# Patient Record
Sex: FEMALE | Race: WHITE | NOT HISPANIC OR LATINO | URBAN - METROPOLITAN AREA
[De-identification: names, ages, dates, MRNs, and addresses within clinical notes are randomized per-mention and may not be internally consistent; named-entity substitution may affect disease eponyms.]

---

## 2020-06-05 ENCOUNTER — INPATIENT (INPATIENT)
Facility: HOSPITAL | Age: 75
LOS: 2 days | Discharge: HOME | DRG: 247 | End: 2020-06-08
Attending: INTERNAL MEDICINE | Admitting: INTERNAL MEDICINE
Payer: MEDICARE

## 2020-06-05 ENCOUNTER — EMERGENCY (EMERGENCY)
Facility: HOSPITAL | Age: 75
LOS: 1 days | Discharge: ANOTHER HOSPITAL | End: 2020-06-05
Attending: EMERGENCY MEDICINE | Admitting: EMERGENCY MEDICINE
Payer: MEDICARE

## 2020-06-05 VITALS
WEIGHT: 167.99 LBS | SYSTOLIC BLOOD PRESSURE: 122 MMHG | RESPIRATION RATE: 16 BRPM | HEIGHT: 64.5 IN | OXYGEN SATURATION: 97 % | DIASTOLIC BLOOD PRESSURE: 63 MMHG | HEART RATE: 72 BPM | TEMPERATURE: 98 F

## 2020-06-05 VITALS
TEMPERATURE: 98 F | SYSTOLIC BLOOD PRESSURE: 158 MMHG | DIASTOLIC BLOOD PRESSURE: 96 MMHG | HEART RATE: 102 BPM | RESPIRATION RATE: 16 BRPM | OXYGEN SATURATION: 99 % | WEIGHT: 167.99 LBS | HEIGHT: 65 IN

## 2020-06-05 VITALS
HEART RATE: 88 BPM | SYSTOLIC BLOOD PRESSURE: 169 MMHG | RESPIRATION RATE: 16 BRPM | DIASTOLIC BLOOD PRESSURE: 79 MMHG | OXYGEN SATURATION: 98 %

## 2020-06-05 DIAGNOSIS — I21.4 NON-ST ELEVATION (NSTEMI) MYOCARDIAL INFARCTION: ICD-10-CM

## 2020-06-05 LAB
ALBUMIN SERPL ELPH-MCNC: 3.4 G/DL — SIGNIFICANT CHANGE UP (ref 3.3–5)
ALP SERPL-CCNC: 89 U/L — SIGNIFICANT CHANGE UP (ref 40–120)
ALT FLD-CCNC: 28 U/L — SIGNIFICANT CHANGE UP (ref 12–78)
ANION GAP SERPL CALC-SCNC: 8 MMOL/L — SIGNIFICANT CHANGE UP (ref 5–17)
APTT BLD: 38 SEC — HIGH (ref 28.5–37)
AST SERPL-CCNC: 33 U/L — SIGNIFICANT CHANGE UP (ref 15–37)
BASOPHILS # BLD AUTO: 0.11 K/UL — SIGNIFICANT CHANGE UP (ref 0–0.2)
BASOPHILS NFR BLD AUTO: 1 % — SIGNIFICANT CHANGE UP (ref 0–2)
BILIRUB SERPL-MCNC: 0.4 MG/DL — SIGNIFICANT CHANGE UP (ref 0.2–1.2)
BUN SERPL-MCNC: 18 MG/DL — SIGNIFICANT CHANGE UP (ref 7–23)
CALCIUM SERPL-MCNC: 9.2 MG/DL — SIGNIFICANT CHANGE UP (ref 8.5–10.1)
CHLORIDE SERPL-SCNC: 110 MMOL/L — HIGH (ref 96–108)
CO2 SERPL-SCNC: 20 MMOL/L — LOW (ref 22–31)
CREAT SERPL-MCNC: 0.77 MG/DL — SIGNIFICANT CHANGE UP (ref 0.5–1.3)
D DIMER BLD IA.RAPID-MCNC: 173 NG/ML DDU — SIGNIFICANT CHANGE UP
EOSINOPHIL # BLD AUTO: 0.68 K/UL — HIGH (ref 0–0.5)
EOSINOPHIL NFR BLD AUTO: 6.4 % — HIGH (ref 0–6)
GLUCOSE SERPL-MCNC: 157 MG/DL — HIGH (ref 70–99)
HCT VFR BLD CALC: 45.6 % — HIGH (ref 34.5–45)
HGB BLD-MCNC: 15.5 G/DL — SIGNIFICANT CHANGE UP (ref 11.5–15.5)
IMM GRANULOCYTES NFR BLD AUTO: 0.4 % — SIGNIFICANT CHANGE UP (ref 0–1.5)
INR BLD: 1.05 RATIO — SIGNIFICANT CHANGE UP (ref 0.88–1.16)
LYMPHOCYTES # BLD AUTO: 28.4 % — SIGNIFICANT CHANGE UP (ref 13–44)
LYMPHOCYTES # BLD AUTO: 3.02 K/UL — SIGNIFICANT CHANGE UP (ref 1–3.3)
MCHC RBC-ENTMCNC: 28.9 PG — SIGNIFICANT CHANGE UP (ref 27–34)
MCHC RBC-ENTMCNC: 34 GM/DL — SIGNIFICANT CHANGE UP (ref 32–36)
MCV RBC AUTO: 85.1 FL — SIGNIFICANT CHANGE UP (ref 80–100)
MONOCYTES # BLD AUTO: 0.74 K/UL — SIGNIFICANT CHANGE UP (ref 0–0.9)
MONOCYTES NFR BLD AUTO: 7 % — SIGNIFICANT CHANGE UP (ref 2–14)
NEUTROPHILS # BLD AUTO: 6.05 K/UL — SIGNIFICANT CHANGE UP (ref 1.8–7.4)
NEUTROPHILS NFR BLD AUTO: 56.8 % — SIGNIFICANT CHANGE UP (ref 43–77)
NRBC # BLD: 0 /100 WBCS — SIGNIFICANT CHANGE UP (ref 0–0)
PLATELET # BLD AUTO: 325 K/UL — SIGNIFICANT CHANGE UP (ref 150–400)
POTASSIUM SERPL-MCNC: 4.6 MMOL/L — SIGNIFICANT CHANGE UP (ref 3.5–5.3)
POTASSIUM SERPL-SCNC: 4.6 MMOL/L — SIGNIFICANT CHANGE UP (ref 3.5–5.3)
PROT SERPL-MCNC: 7.6 G/DL — SIGNIFICANT CHANGE UP (ref 6–8.3)
PROTHROM AB SERPL-ACNC: 11.8 SEC — SIGNIFICANT CHANGE UP (ref 10–12.9)
RBC # BLD: 5.36 M/UL — HIGH (ref 3.8–5.2)
RBC # FLD: 12.6 % — SIGNIFICANT CHANGE UP (ref 10.3–14.5)
SARS-COV-2 RNA SPEC QL NAA+PROBE: SIGNIFICANT CHANGE UP
SODIUM SERPL-SCNC: 138 MMOL/L — SIGNIFICANT CHANGE UP (ref 135–145)
TROPONIN I SERPL-MCNC: 0.63 NG/ML — HIGH (ref 0.01–0.04)
WBC # BLD: 10.64 K/UL — HIGH (ref 3.8–10.5)
WBC # FLD AUTO: 10.64 K/UL — HIGH (ref 3.8–10.5)

## 2020-06-05 PROCEDURE — 93010 ELECTROCARDIOGRAM REPORT: CPT

## 2020-06-05 PROCEDURE — 71046 X-RAY EXAM CHEST 2 VIEWS: CPT

## 2020-06-05 PROCEDURE — 99284 EMERGENCY DEPT VISIT MOD MDM: CPT

## 2020-06-05 PROCEDURE — 99291 CRITICAL CARE FIRST HOUR: CPT

## 2020-06-05 PROCEDURE — 93005 ELECTROCARDIOGRAM TRACING: CPT

## 2020-06-05 PROCEDURE — 99285 EMERGENCY DEPT VISIT HI MDM: CPT | Mod: 25

## 2020-06-05 PROCEDURE — 85027 COMPLETE CBC AUTOMATED: CPT

## 2020-06-05 PROCEDURE — 85730 THROMBOPLASTIN TIME PARTIAL: CPT

## 2020-06-05 PROCEDURE — 92941 PRQ TRLML REVSC TOT OCCL AMI: CPT | Mod: LD

## 2020-06-05 PROCEDURE — 80053 COMPREHEN METABOLIC PANEL: CPT

## 2020-06-05 PROCEDURE — 84484 ASSAY OF TROPONIN QUANT: CPT

## 2020-06-05 PROCEDURE — 85610 PROTHROMBIN TIME: CPT

## 2020-06-05 PROCEDURE — 36415 COLL VENOUS BLD VENIPUNCTURE: CPT

## 2020-06-05 PROCEDURE — 82550 ASSAY OF CK (CPK): CPT

## 2020-06-05 PROCEDURE — 87635 SARS-COV-2 COVID-19 AMP PRB: CPT

## 2020-06-05 PROCEDURE — 99152 MOD SED SAME PHYS/QHP 5/>YRS: CPT

## 2020-06-05 PROCEDURE — 93454 CORONARY ARTERY ANGIO S&I: CPT | Mod: 26,59

## 2020-06-05 PROCEDURE — 71046 X-RAY EXAM CHEST 2 VIEWS: CPT | Mod: 26

## 2020-06-05 PROCEDURE — 85379 FIBRIN DEGRADATION QUANT: CPT

## 2020-06-05 RX ORDER — SODIUM CHLORIDE 9 MG/ML
1000 INJECTION INTRAMUSCULAR; INTRAVENOUS; SUBCUTANEOUS ONCE
Refills: 0 | Status: COMPLETED | OUTPATIENT
Start: 2020-06-05 | End: 2020-06-05

## 2020-06-05 RX ORDER — METOPROLOL TARTRATE 50 MG
25 TABLET ORAL
Refills: 0 | Status: DISCONTINUED | OUTPATIENT
Start: 2020-06-05 | End: 2020-06-07

## 2020-06-05 RX ORDER — CLOPIDOGREL BISULFATE 75 MG/1
75 TABLET, FILM COATED ORAL DAILY
Refills: 0 | Status: DISCONTINUED | OUTPATIENT
Start: 2020-06-06 | End: 2020-06-08

## 2020-06-05 RX ORDER — NORTRIPTYLINE HYDROCHLORIDE 10 MG/1
100 CAPSULE ORAL AT BEDTIME
Refills: 0 | Status: DISCONTINUED | OUTPATIENT
Start: 2020-06-05 | End: 2020-06-08

## 2020-06-05 RX ORDER — METOPROLOL TARTRATE 50 MG
25 TABLET ORAL ONCE
Refills: 0 | Status: COMPLETED | OUTPATIENT
Start: 2020-06-05 | End: 2020-06-05

## 2020-06-05 RX ORDER — ATORVASTATIN CALCIUM 80 MG/1
20 TABLET, FILM COATED ORAL ONCE
Refills: 0 | Status: COMPLETED | OUTPATIENT
Start: 2020-06-05 | End: 2020-06-05

## 2020-06-05 RX ORDER — ATORVASTATIN CALCIUM 80 MG/1
40 TABLET, FILM COATED ORAL AT BEDTIME
Refills: 0 | Status: DISCONTINUED | OUTPATIENT
Start: 2020-06-05 | End: 2020-06-08

## 2020-06-05 RX ORDER — ASPIRIN/CALCIUM CARB/MAGNESIUM 324 MG
81 TABLET ORAL DAILY
Refills: 0 | Status: DISCONTINUED | OUTPATIENT
Start: 2020-06-06 | End: 2020-06-08

## 2020-06-05 RX ORDER — CLONAZEPAM 1 MG
0.25 TABLET ORAL AT BEDTIME
Refills: 0 | Status: DISCONTINUED | OUTPATIENT
Start: 2020-06-05 | End: 2020-06-08

## 2020-06-05 RX ORDER — SUCRALFATE 1 G
1 TABLET ORAL ONCE
Refills: 0 | Status: COMPLETED | OUTPATIENT
Start: 2020-06-05 | End: 2020-06-05

## 2020-06-05 RX ORDER — CLONAZEPAM 1 MG
0.5 TABLET ORAL ONCE
Refills: 0 | Status: DISCONTINUED | OUTPATIENT
Start: 2020-06-05 | End: 2020-06-05

## 2020-06-05 RX ADMIN — Medication 25 MILLIGRAM(S): at 14:46

## 2020-06-05 RX ADMIN — NORTRIPTYLINE HYDROCHLORIDE 100 MILLIGRAM(S): 10 CAPSULE ORAL at 21:51

## 2020-06-05 RX ADMIN — Medication 0.5 MILLIGRAM(S): at 13:35

## 2020-06-05 RX ADMIN — ATORVASTATIN CALCIUM 20 MILLIGRAM(S): 80 TABLET, FILM COATED ORAL at 14:46

## 2020-06-05 RX ADMIN — ATORVASTATIN CALCIUM 40 MILLIGRAM(S): 80 TABLET, FILM COATED ORAL at 21:50

## 2020-06-05 RX ADMIN — Medication 0.25 MILLIGRAM(S): at 21:51

## 2020-06-05 RX ADMIN — SODIUM CHLORIDE 1000 MILLILITER(S): 9 INJECTION INTRAMUSCULAR; INTRAVENOUS; SUBCUTANEOUS at 12:43

## 2020-06-05 RX ADMIN — Medication 1 GRAM(S): at 14:02

## 2020-06-05 NOTE — H&P CARDIOLOGY - HISTORY OF PRESENT ILLNESS
76 y/o female without reported PMHx of anxiety, depression presented to [facility] today c/o chest pain worsening 3am this morning 7-8/10. Pt reports she has been experiencing midsternal chest pain for past few weeks usually relieved with ASA 81mg with moving around and got worse 3am today woke up from sleep radiated to left arm. Pt's  drove pt to urgent care then advised to go to ER. Pt denies hemoptysis, recent travel/surgery, fever, chills, numbness/weakness, palpitations, leg swelling, calf pain, or any other complaints. Pt had Troponin 0.63, COVID Negative today    PCP Jalil Velásquez (), will f/u Dr. Carrasquillo ('s PCP)  Card: EAN Frazier 76 y/o  female with PMHx of anxiety, depression presented to [facility] ER today c/o chest pain worsening 3am this morning 7-8/10. Pt reports she has been experiencing midsternal chest pain for past few weeks usually relieved with ASA 81mg with moving around and got worse 3am today woke up from sleep0 radiated to left arm. Pt's  drove pt to urgent care then advised to go to ER. Pt denies hemoptysis, recent travel/surgery, fever, chills, numbness/weakness, palpitations, leg swelling, calf pain, or any other complaints. Pt had Troponin 0.63, COVID Negative today, now transferred here for cardiac cath. Pt currently denies chest pain, SOB or palpitation.     PCP Jalil Velásquez (), will f/u Dr. Carrasquillo ('s PCP)  Card: EAN Frazier

## 2020-06-05 NOTE — ED PROVIDER NOTE - ATTENDING CONTRIBUTION TO CARE
Pt is a 76 yo female who presents to the ED with a cc of chest discomfort and reported abnormal EKG from urgent care.  PMHx of anxiety, depression.  Pt reports that for the last several weeks she has been experiencing generalized fatigue and right chest discomfort with radiation to her right arm.  She reports that the chest discomfort is generally present at night.  She has no known cardiac disease.  When present she reports that the discomfort is pressure like In nature.  Currently pt reports that she is symptom free at this time.  When symptoms are present they are not worsened with exertion.  Denies pleuritic component.         76 y/o female without reported PMHx sent from Urgent care due to chest pain x weeks. Pt reports chest pain worsened last night which prompted her to come to ED, but pain has resolved currently. pt describes pain as pressure, "sitting on chest", radiating down right arm, and currently 0/10. Pt reports no specific modifying factors, not worse with exertion/deep breathing, notes typically occurs at night when laying. pt admits to SOB for months without acute change, notes typically occurs going up and down the stairs. pt denies hemoptysis, recent travel/surgery, fever, chills, numbness/weakness, palpitations, hx of CAD, leg swelling, calf pain, or any other complaints. Pt is a 74 yo female who presents to the ED with a cc of chest discomfort and reported abnormal EKG from urgent care.  PMHx of anxiety, depression.  Pt reports that for the last several weeks she has been experiencing generalized fatigue and right chest discomfort with radiation to her right arm.  She reports that the chest discomfort is generally present at night.  She has no known cardiac disease.  When present she reports that the discomfort is pressure like In nature.  Currently pt reports that she is symptom free at this time.  When symptoms are present they are not worsened with exertion.  Denies pleuritic component. Denies fever, chills, V/D/C, CP at this time, SOB, cough, abd pain, ext numbness.  Denies HA.  Pt does report intermittent nausea not currently present.  Denies noting calf pain or swelling. She was concerned about her symptoms and so she presented to urgent care where there was concern regarding her EKG and so pt was sent to the ED for further work up. On exam pt lying in bed anxious and tearful. NCAT, PERRL, EOMI, heart sinus tachycardia, lungs CTA, abd soft NT/ND.  CN II-XII intact.   Pt with atypical chest discomfort, non exertional, no SOB.  Symptoms appear to have an anxiety component.  Will obtain screening labs, EKG, check troponin, d dimer, and monitor.  Emotional support provided

## 2020-06-05 NOTE — ED PROVIDER NOTE - OBJECTIVE STATEMENT
76 y/o female without reported PMHx sent from Urgent care due to chest pain x weeks. Pt reports chest pain worsened last night which prompted her to come to ED, but pain has resolved currently. pt describes pain as pressure, "sitting on chest", radiating down right arm, and currently 0/10. Pt reports no specific modifying factors, not worse with exertion/deep breathing, notes typically occurs at night when laying. pt admits to SOB for months without acute change, notes typically occurs going up and down the stairs. pt denies hemoptysis, recent travel/surgery, fever, chills, numbness/weakness, palpitations, hx of CAD, leg swelling, calf pain, or any other complaints.   PCP Jalil Velásquez ()  Cardio- none 74 y/o female without reported PMHx sent from Urgent care due to chest pain x weeks. Pt reports chest pain worsened last night which prompted her to come to ED, but pain has resolved currently. pt describes pain as pressure, "sitting on chest", radiating down right arm, and currently 0/10. Pt reports no specific modifying factors, not worse with exertion/deep breathing, notes typically occurs at night when laying. pt admits to SOB for months without acute change, notes typically occurs going up and down the stairs. Pt notes she took Aspirin with some relief. pt denies hemoptysis, recent travel/surgery, fever, chills, numbness/weakness, palpitations, hx of CAD, leg swelling, calf pain, or any other complaints.   PCP Jalil Velásquez ()  Cardio- none

## 2020-06-05 NOTE — ED PROVIDER NOTE - PHYSICAL EXAMINATION
Constitutional: Awake, Alert, non-toxic. NAD. Well appearing, well nourished.   HEAD: Normocephalic, atraumatic.   EYES: EOM intact, conjunctiva and sclera are clear bilaterally.   ENT: No rhinorrhea, patent, mucous membranes pink/moist, no drooling or stridor.   NECK: Supple, non-tender  CARDIOVASCULAR: Normal S1, S2; regular rate and rhythm.  RESPIRATORY: Normal respiratory effort; breath sounds CTAB, no wheezes, rhonchi, or rales. Speaking in full sentences. No accessory muscle use.   ABDOMEN: Soft; non-tender, non-distended.   EXTREMITIES: Full passive and active ROM in all extremities; non-tender to palpation; distal pulses palpable and symmetric, no LE edema, negative Santa Sign.   SKIN: Warm, dry; good skin turgor, no apparent lesions or rashes, no ecchymosis, brisk capillary refill.  NEURO: A&O x3. Sensory and motor functions are grossly intact. Speech is normal. Appearance and judgement seem appropriate for gender and age.

## 2020-06-05 NOTE — ED PROVIDER NOTE - CLINICAL SUMMARY MEDICAL DECISION MAKING FREE TEXT BOX
c/o chest pain x weeks. Notes acute worsening last night, radiation down right arm. Plan includes labs, EKG/troponin r/o CAD, CXR, cardio consult, re-assess

## 2020-06-05 NOTE — CONSULT NOTE ADULT - ASSESSMENT
apparent nstemi  has received asa  will swab for covid as precursor to transfer  if cannot get cath till monday would be unfortunate given her anxiety levels which are high  if needs to stay would use dapt, heparin gtt x 24h, statin, bb, bp control  echo  monitor for recurrent ischemia, arrhythmia and heart failure  plan for cath today if covid neg in time for the lab to do this today, otherwise will need to hold her until after the weekend    The patient is at risk of abrupt decompensation.  I have personally provided  35 minutes of critical care time, excluding time spent on separate procedures.

## 2020-06-05 NOTE — ED PROVIDER NOTE - SHIFT CHANGE DETAILS
pt with suspected NSTEMI pending COVID prior to cath lab acceptance.  Paperwork for transfer partially completed.  Pt symptom free at this time

## 2020-06-05 NOTE — ED PROVIDER NOTE - CROS ED [facility] ALL NEG
----- Message from Susan Winters MA sent at 4/3/2017  9:53 AM CDT -----  Pt states that she is bleeding for the past 2 weeks. She is on the depo. [phone]   
Patient state she is experiencing a minimal menstural cycle on Depo Provera injection. Instructed patient to contact our office is bleeding last longer than 7 days or if she is saturating a pad an hour. Patient verbalized understanding with no questions or concerns. Denies saturating a pad an hour, cramping, pain, fever, nausea, vomiting or any other symptoms.   
negative...

## 2020-06-05 NOTE — CHART NOTE - NSCHARTNOTEFT_GEN_A_CORE
Patient underwent a PCI procedure and is being admitted as they are at increased risk for major adverse cardiac and vascular events if discharged due to the following high risk characteristics:      Pre-procedure Clinical Criteria  Major: NSTEMI  Minor:     Patient s/p FRANCISCO J x1 to Diag.  via RRA. Started on aspirin, plavix, atorvastatin, metoprolol. Band to be removed at 930pm by CCU NP.     Nicci Sharma [facility]  Invasive Cardiology  [phone]

## 2020-06-05 NOTE — CONSULT NOTE ADULT - SUBJECTIVE AND OBJECTIVE BOX
[facility]         Joni Ace, Emelyn, Karin, Yuri, Joshua, Gabe        [phone] (office)    CHIEF COMPLAINT: Patient is a 75y old  Female who presents with a chief complaint of cp    HPI:  75f little regular medical care, as she generally feels well.  Has anxiety and depression for which she has been on nortriptyline and klonipin.  Has not had sxs suggestive of angina, heart failure or arrhythmia.  For about 1 month has been having sxs of mid-sternal chest discomfort in bed, without rad, assoc sxs and without clear provocation.  These sxs would go away when she would turn over.    This am at 3a she was awakened from sleep by chest discomfort rad to right arm, lasting 1 hour. fell asleep till 630 this am and when she woke up she still had sxs in her right arm.  She went to urgent care and was referred her.    her ekg shows a sr with a rbbb of indeterminate age.  Her trop is elevated. consultation is now being obtained.    PAST MEDICAL & SURGICAL HISTORY:  Depression  Anxiety  No significant past surgical history      SOCIAL HISTORY: No active tobacco, alcohol or illicit drug use    FAMILY HISTORY:   No pertinent family history of CAD    Outpatient medications: nortryp, klonipin    MEDICATIONS  (STANDING):    MEDICATIONS  (PRN):      Allergies    No Known Allergies    Intolerances        REVIEW OF SYSTEMS: Is negative for eye, ENT, GI, , allergic, dermatologic, musculoskeletal and neurologic, except as described above.    VITAL SIGNS:   Vital Signs Last 24 Hrs  T(C): 36.8 (05 Jun 2020 11:23), Max: 36.8 (05 Jun 2020 11:23)  T(F): 98.2 (05 Jun 2020 11:23), Max: 98.2 (05 Jun 2020 11:23)  HR: 102 (05 Jun 2020 11:23) (102 - 102)  BP: 158/96 (05 Jun 2020 11:23) (158/96 - 158/96)  BP(mean): --  RR: 16 (05 Jun 2020 11:23) (16 - 16)  SpO2: 99% (05 Jun 2020 11:23) (99% - 99%)    I&O's Summary      PHYSICAL EXAM:    Constitutional: NAD, awake and alert, well-developed  Eyes:  EOMI, no oral cyanosis, conjunctivae clear, anicteric.  Pulmonary: Non-labored, breath sounds are clear bilaterally, no wheezing, rales or rhonchi  Cardiovascular:  regular S1 and S2. No murmur.  No rubs, gallops or clicks  Gastrointestinal: Bowel Sounds present, soft, nontender.   Lymph: No peripheral edema.   Neurological: Alert, strength and sensitivity are grossly intact  Skin: No obvious lesions/rashes.   Psych:  Mood & affect appropriate .    LABS: All Labs Reviewed:                        15.5   10.64 )-----------( 325      ( 05 Jun 2020 12:02 )             45.6     05 Jun 2020 12:38    138    |  110    |  18     ----------------------------<  157    4.6     |  20     |  0.77     Ca    9.2        05 Jun 2020 12:38    TPro  7.6    /  Alb  3.4    /  TBili  0.4    /  DBili  x      /  AST  33     /  ALT  28     /  AlkPhos  89     05 Jun 2020 12:38    PT/INR - ( 05 Jun 2020 12:02 )   PT: 11.8 sec;   INR: 1.05 ratio         PTT - ( 05 Jun 2020 12:02 )  PTT:38.0 sec  CARDIAC MARKERS ( 05 Jun 2020 12:38 )  .630 ng/mL / x     / 167 U/L / x     / x          Blood Culture:         RADIOLOGY:    EKG:    Impression/Plan:

## 2020-06-05 NOTE — CHART NOTE - NSCHARTNOTEFT_GEN_A_CORE
Removal of Radial Band    Pulses in the right upper extremity are palpable. The patient was placed in the supine position. The insertion site was identified and the band deflated per protocol. The radial band was removed slowly.     Monitoring of the right wrists and both upper extremities including neuro-vascular checks and vital signs every 15 minutes x 4.    Complications: None    Comments: Patient tolerated procedure well with no blood loss.

## 2020-06-05 NOTE — ED ADULT NURSE NOTE - OBJECTIVE STATEMENT
pt with complaints of chest pain last night and pt went to urgent care this morning and was sent here for EKG changes. pt denies chest pain at this time.

## 2020-06-05 NOTE — ED PROVIDER NOTE - PROGRESS NOTE DETAILS
Discussed With Dr. Frazier, recommending transfer to [facility] for CAth, advised to send to [facility] for transfer. Discussed with Nima from Transfer center, discussed with Dr. Rico, pt to be transferred to cath

## 2020-06-06 LAB
ALBUMIN SERPL ELPH-MCNC: 3.7 G/DL — SIGNIFICANT CHANGE UP (ref 3.3–5)
ALP SERPL-CCNC: 80 U/L — SIGNIFICANT CHANGE UP (ref 40–120)
ALT FLD-CCNC: 22 U/L — SIGNIFICANT CHANGE UP (ref 10–45)
ANION GAP SERPL CALC-SCNC: 12 MMOL/L — SIGNIFICANT CHANGE UP (ref 5–17)
AST SERPL-CCNC: 22 U/L — SIGNIFICANT CHANGE UP (ref 10–40)
BASOPHILS # BLD AUTO: 0.12 K/UL — SIGNIFICANT CHANGE UP (ref 0–0.2)
BASOPHILS NFR BLD AUTO: 1 % — SIGNIFICANT CHANGE UP (ref 0–2)
BILIRUB SERPL-MCNC: 0.2 MG/DL — SIGNIFICANT CHANGE UP (ref 0.2–1.2)
BUN SERPL-MCNC: 16 MG/DL — SIGNIFICANT CHANGE UP (ref 7–23)
CALCIUM SERPL-MCNC: 9.1 MG/DL — SIGNIFICANT CHANGE UP (ref 8.4–10.5)
CHLORIDE SERPL-SCNC: 104 MMOL/L — SIGNIFICANT CHANGE UP (ref 96–108)
CO2 SERPL-SCNC: 21 MMOL/L — LOW (ref 22–31)
CREAT SERPL-MCNC: 0.72 MG/DL — SIGNIFICANT CHANGE UP (ref 0.5–1.3)
EOSINOPHIL # BLD AUTO: 0.79 K/UL — HIGH (ref 0–0.5)
EOSINOPHIL NFR BLD AUTO: 6.9 % — HIGH (ref 0–6)
GLUCOSE SERPL-MCNC: 167 MG/DL — HIGH (ref 70–99)
HCT VFR BLD CALC: 43.8 % — SIGNIFICANT CHANGE UP (ref 34.5–45)
HGB BLD-MCNC: 14.4 G/DL — SIGNIFICANT CHANGE UP (ref 11.5–15.5)
IMM GRANULOCYTES NFR BLD AUTO: 0.3 % — SIGNIFICANT CHANGE UP (ref 0–1.5)
LYMPHOCYTES # BLD AUTO: 3.56 K/UL — HIGH (ref 1–3.3)
LYMPHOCYTES # BLD AUTO: 31.1 % — SIGNIFICANT CHANGE UP (ref 13–44)
MCHC RBC-ENTMCNC: 28.6 PG — SIGNIFICANT CHANGE UP (ref 27–34)
MCHC RBC-ENTMCNC: 32.9 GM/DL — SIGNIFICANT CHANGE UP (ref 32–36)
MCV RBC AUTO: 87.1 FL — SIGNIFICANT CHANGE UP (ref 80–100)
MONOCYTES # BLD AUTO: 0.77 K/UL — SIGNIFICANT CHANGE UP (ref 0–0.9)
MONOCYTES NFR BLD AUTO: 6.7 % — SIGNIFICANT CHANGE UP (ref 2–14)
NEUTROPHILS # BLD AUTO: 6.16 K/UL — SIGNIFICANT CHANGE UP (ref 1.8–7.4)
NEUTROPHILS NFR BLD AUTO: 54 % — SIGNIFICANT CHANGE UP (ref 43–77)
NRBC # BLD: 0 /100 WBCS — SIGNIFICANT CHANGE UP (ref 0–0)
PLATELET # BLD AUTO: 287 K/UL — SIGNIFICANT CHANGE UP (ref 150–400)
POTASSIUM SERPL-MCNC: 4 MMOL/L — SIGNIFICANT CHANGE UP (ref 3.5–5.3)
POTASSIUM SERPL-SCNC: 4 MMOL/L — SIGNIFICANT CHANGE UP (ref 3.5–5.3)
PROT SERPL-MCNC: 6.6 G/DL — SIGNIFICANT CHANGE UP (ref 6–8.3)
RBC # BLD: 5.03 M/UL — SIGNIFICANT CHANGE UP (ref 3.8–5.2)
RBC # FLD: 12.7 % — SIGNIFICANT CHANGE UP (ref 10.3–14.5)
SODIUM SERPL-SCNC: 137 MMOL/L — SIGNIFICANT CHANGE UP (ref 135–145)
WBC # BLD: 11.43 K/UL — HIGH (ref 3.8–10.5)
WBC # FLD AUTO: 11.43 K/UL — HIGH (ref 3.8–10.5)

## 2020-06-06 PROCEDURE — 99222 1ST HOSP IP/OBS MODERATE 55: CPT

## 2020-06-06 PROCEDURE — 93010 ELECTROCARDIOGRAM REPORT: CPT

## 2020-06-06 RX ADMIN — Medication 25 MILLIGRAM(S): at 05:00

## 2020-06-06 RX ADMIN — ATORVASTATIN CALCIUM 40 MILLIGRAM(S): 80 TABLET, FILM COATED ORAL at 20:42

## 2020-06-06 RX ADMIN — Medication 81 MILLIGRAM(S): at 12:04

## 2020-06-06 RX ADMIN — Medication 0.25 MILLIGRAM(S): at 20:42

## 2020-06-06 RX ADMIN — NORTRIPTYLINE HYDROCHLORIDE 100 MILLIGRAM(S): 10 CAPSULE ORAL at 20:42

## 2020-06-06 RX ADMIN — Medication 25 MILLIGRAM(S): at 17:04

## 2020-06-06 RX ADMIN — CLOPIDOGREL BISULFATE 75 MILLIGRAM(S): 75 TABLET, FILM COATED ORAL at 12:05

## 2020-06-06 NOTE — CONSULT NOTE ADULT - ASSESSMENT
74 y/o  female with PMHx of anxiety, depression presented to [facility] [place] ER yesterday c/o chest pain, NSTEMI, s/p PCI to the diagonal, tolerated well    - Continue telemetry  - Continue dAPT with aspirin and Plavix  - Continue statin drug  - Just started on metoprolol 25 bid. BP mildly elevated.  To increase as tolerated  - NO tele events  - Possible ARB in the future\  - Check echocardiogram  - Continue to trend cardiac enzymes  - To follow

## 2020-06-06 NOTE — CONSULT NOTE ADULT - SUBJECTIVE AND OBJECTIVE BOX
[facility] - Joni Ace, Emelyn, Karin, Yuri, Gabe Lewis  Office Number: [phone]    Initial Consult Note    CHIEF COMPLAINT: Patient is a 75y old  Female who presents with a chief complaint of chest pain    HPI:  76 y/o  female with PMHx of anxiety, depression presented to [facility] ER yesterday c/o chest pain worsening 3am this morning 7-8/10. Pt reports she has been experiencing midsternal chest pain for past few weeks usually relieved with ASA 81mg with moving around and got worse 3am today woke up from sleep0 radiated to left arm. Pt's  drove pt to urgent care then advised to go to ER. Pt denies hemoptysis, recent travel/surgery, fever, chills, numbness/weakness, palpitations, leg swelling, calf pain, or any other complaints. Pt had Troponin 0.63, COVID Negative today, now transferred here for cardiac cath. Pt currently denies chest pain, SOB or palpitation.     PCP Jalil Velásquez (), will f/u Dr. Carrasquillo ('s PCP)      She is now s/p PCI to D1 with FRANCISCO J. Tolerated well.  NO wrist pain.  NO recurrent chest pain, dyspnea, PND, orthopnea, LE swelling, dizziness, or syncope.  SHe is currently tolerating her medications.        PAST MEDICAL & SURGICAL HISTORY:  Depression  Anxiety  No significant past surgical history      SOCIAL HISTORY:  No tobacco, ethanol, or drug abuse.    FAMILY HISTORY:  No pertinent family history in first degree relatives    No family history of acute MI or sudden cardiac death.    MEDICATIONS  (STANDING):  aspirin enteric coated 81 milliGRAM(s) Oral daily  atorvastatin 40 milliGRAM(s) Oral at bedtime  clonazePAM  Tablet 0.25 milliGRAM(s) Oral at bedtime  clopidogrel Tablet 75 milliGRAM(s) Oral daily  metoprolol tartrate 25 milliGRAM(s) Oral two times a day  nortriptyline 100 milliGRAM(s) Oral at bedtime          Allergies    No Known Allergies            REVIEW OF SYSTEMS:    All other review of systems is negative unless indicated above    VITAL SIGNS:   Vital Signs Last 24 Hrs  T(C): 36.6 (2020 04:57), Max: 36.9 (2020 17:32)  T(F): 97.8 (2020 04:57), Max: 98.4 (2020 17:32)  HR: 80 (2020 04:57) (67 - 102)  BP: 147/76 (2020 04:57) (122/63 - 169/79)  BP(mean): 82 (2020 17:32) (82 - 82)  RR: 16 (2020 04:57) (16 - 17)  SpO2: 97% (2020 04:57) (95% - 99%)    I&O's Summary      On Exam:    Constitutional: NAD, alert and oriented x 3  Lungs:  Non-labored, breath sounds are clear bilaterally, No wheezing, rales or rhonchi  Cardiovascular: RRR.  S1 and S2 positive.  No murmurs, rubs, gallops or clicks  Gastrointestinal: Bowel Sounds present, soft, nontender.   Lymph: No peripheral edema. No cervical lymphadenopathy.  Neurological: Alert, no focal deficits  Skin: No rashes or ulcers   Psych:  Mood & affect appropriate.    LABS: All Labs Reviewed:                        14.4   11.43 )-----------( 287      ( 2020 06:02 )             43.8                         15.5   10.64 )-----------( 325      ( 2020 12:02 )             45.6     2020 06:02    137    |  104    |  16     ----------------------------<  167    4.0     |  21     |  0.72   2020 12:38    138    |  110    |  18     ----------------------------<  157    4.6     |  20     |  0.77     Ca    9.1        2020 06:02  Ca    9.2        2020 12:38    TPro  6.6    /  Alb  3.7    /  TBili  0.2    /  DBili  x      /  AST  22     /  ALT  22     /  AlkPhos  80     2020 06:02  TPro  7.6    /  Alb  3.4    /  TBili  0.4    /  DBili  x      /  AST  33     /  ALT  28     /  AlkPhos  89     2020 12:38    PT/INR - ( 2020 12:02 )   PT: 11.8 sec;   INR: 1.05 ratio         PTT - ( 2020 12:02 )  PTT:38.0 sec  CARDIAC MARKERS ( 2020 12:38 )  .630 ng/mL / x     / 167 U/L / x     / x

## 2020-06-07 LAB
CK MB BLD-MCNC: 2.9 % — SIGNIFICANT CHANGE UP (ref 0–3.5)
CK MB CFR SERPL CALC: 2.8 NG/ML — SIGNIFICANT CHANGE UP (ref 0–3.8)
CK SERPL-CCNC: 97 U/L — SIGNIFICANT CHANGE UP (ref 25–170)
HCT VFR BLD CALC: 43.8 % — SIGNIFICANT CHANGE UP (ref 34.5–45)
HGB BLD-MCNC: 14.4 G/DL — SIGNIFICANT CHANGE UP (ref 11.5–15.5)
MCHC RBC-ENTMCNC: 28.5 PG — SIGNIFICANT CHANGE UP (ref 27–34)
MCHC RBC-ENTMCNC: 32.9 GM/DL — SIGNIFICANT CHANGE UP (ref 32–36)
MCV RBC AUTO: 86.7 FL — SIGNIFICANT CHANGE UP (ref 80–100)
NRBC # BLD: 0 /100 WBCS — SIGNIFICANT CHANGE UP (ref 0–0)
PLATELET # BLD AUTO: 276 K/UL — SIGNIFICANT CHANGE UP (ref 150–400)
RBC # BLD: 5.05 M/UL — SIGNIFICANT CHANGE UP (ref 3.8–5.2)
RBC # FLD: 12.5 % — SIGNIFICANT CHANGE UP (ref 10.3–14.5)
TROPONIN T, HIGH SENSITIVITY RESULT: 231 NG/L — HIGH (ref 0–51)
TROPONIN T, HIGH SENSITIVITY RESULT: 248 NG/L — HIGH (ref 0–51)
WBC # BLD: 9.65 K/UL — SIGNIFICANT CHANGE UP (ref 3.8–10.5)
WBC # FLD AUTO: 9.65 K/UL — SIGNIFICANT CHANGE UP (ref 3.8–10.5)

## 2020-06-07 PROCEDURE — 99232 SBSQ HOSP IP/OBS MODERATE 35: CPT

## 2020-06-07 RX ORDER — ERYTHROMYCIN BASE 5 MG/GRAM
1 OINTMENT (GRAM) OPHTHALMIC (EYE)
Refills: 0 | Status: DISCONTINUED | OUTPATIENT
Start: 2020-06-07 | End: 2020-06-08

## 2020-06-07 RX ORDER — ENOXAPARIN SODIUM 100 MG/ML
40 INJECTION SUBCUTANEOUS DAILY
Refills: 0 | Status: DISCONTINUED | OUTPATIENT
Start: 2020-06-07 | End: 2020-06-08

## 2020-06-07 RX ORDER — LOSARTAN POTASSIUM 100 MG/1
25 TABLET, FILM COATED ORAL ONCE
Refills: 0 | Status: COMPLETED | OUTPATIENT
Start: 2020-06-07 | End: 2020-06-07

## 2020-06-07 RX ORDER — METOPROLOL TARTRATE 50 MG
50 TABLET ORAL
Refills: 0 | Status: DISCONTINUED | OUTPATIENT
Start: 2020-06-07 | End: 2020-06-08

## 2020-06-07 RX ORDER — LOSARTAN POTASSIUM 100 MG/1
25 TABLET, FILM COATED ORAL DAILY
Refills: 0 | Status: DISCONTINUED | OUTPATIENT
Start: 2020-06-08 | End: 2020-06-08

## 2020-06-07 RX ADMIN — LOSARTAN POTASSIUM 25 MILLIGRAM(S): 100 TABLET, FILM COATED ORAL at 08:20

## 2020-06-07 RX ADMIN — Medication 25 MILLIGRAM(S): at 05:21

## 2020-06-07 RX ADMIN — Medication 81 MILLIGRAM(S): at 10:26

## 2020-06-07 RX ADMIN — ATORVASTATIN CALCIUM 40 MILLIGRAM(S): 80 TABLET, FILM COATED ORAL at 22:05

## 2020-06-07 RX ADMIN — Medication 50 MILLIGRAM(S): at 17:41

## 2020-06-07 RX ADMIN — CLOPIDOGREL BISULFATE 75 MILLIGRAM(S): 75 TABLET, FILM COATED ORAL at 10:26

## 2020-06-07 RX ADMIN — NORTRIPTYLINE HYDROCHLORIDE 100 MILLIGRAM(S): 10 CAPSULE ORAL at 22:05

## 2020-06-07 RX ADMIN — Medication 1 APPLICATION(S): at 17:42

## 2020-06-07 RX ADMIN — ENOXAPARIN SODIUM 40 MILLIGRAM(S): 100 INJECTION SUBCUTANEOUS at 10:26

## 2020-06-07 RX ADMIN — Medication 0.25 MILLIGRAM(S): at 22:05

## 2020-06-07 NOTE — PROVIDER CONTACT NOTE (CRITICAL VALUE NOTIFICATION) - BACKGROUND
PMHx of anxiety, depression presented to [facility] ER today c/o chest pain worsening 3am this morning 7-8/10. Pt reports she has been experiencing midsternal chest pain for past few weeks usually relieved with ASA

## 2020-06-07 NOTE — PROVIDER CONTACT NOTE (CRITICAL VALUE NOTIFICATION) - ASSESSMENT
Patient is alert and oriented x4, asymptomatic. Patient does not complain of any pain or discomfort.

## 2020-06-07 NOTE — PROGRESS NOTE ADULT - SUBJECTIVE AND OBJECTIVE BOX
[facility] - Joni Ace, Emelyn, Karin, Yuri, Gabe Lewis  Office Number:  [phone]    Patient resting comfortably in bed in NAD.  Laying flat with no respiratory distress.  No complaints of chest pain, dyspnea, palpitations, PND, or orthopnea.    F/U for:  NSTEMI    Telemetry:  NSR    MEDICATIONS  (STANDING):  aspirin enteric coated 81 milliGRAM(s) Oral daily  atorvastatin 40 milliGRAM(s) Oral at bedtime  clonazePAM  Tablet 0.25 milliGRAM(s) Oral at bedtime  clopidogrel Tablet 75 milliGRAM(s) Oral daily  metoprolol tartrate 25 milliGRAM(s) Oral two times a day  nortriptyline 100 milliGRAM(s) Oral at bedtime        Allergies    No Known Allergies          Vital Signs Last 24 Hrs  T(C): 36.6 (07 Jun 2020 04:35), Max: 36.8 (06 Jun 2020 20:17)  T(F): 97.9 (07 Jun 2020 04:35), Max: 98.3 (06 Jun 2020 20:17)  HR: 81 (07 Jun 2020 04:35) (75 - 82)  BP: 141/81 (07 Jun 2020 04:35) (141/81 - 168/87)  BP(mean): --  RR: 16 (07 Jun 2020 04:35) (16 - 16)  SpO2: 95% (07 Jun 2020 04:35) (95% - 98%)    I&O's Summary    06 Jun 2020 07:01  -  07 Jun 2020 07:00  --------------------------------------------------------  IN: 120 mL / OUT: 0 mL / NET: 120 mL        ON EXAM:    General: NAD, awake and alert, oriented x 3  HEENT: Mucous membranes are moist, anicteric  Lungs: Non-labored, breath sounds are clear bilaterally, No wheezing, rales or rhonchi  Cardiovascular: Regular, S1 and S2, no murmurs, rubs, or gallops  Gastrointestinal: Bowel Sounds present, soft, nontender.   Lymph: No peripheral edema. No lymphadenopathy.  Skin: No rashes or ulcers  Psych:  Mood & affect appropriate    LABS: All Labs Reviewed:                        14.4   9.65  )-----------( 276      ( 07 Jun 2020 06:28 )             43.8                         14.4   11.43 )-----------( 287      ( 06 Jun 2020 06:02 )             43.8                         15.5   10.64 )-----------( 325      ( 05 Jun 2020 12:02 )             45.6     06 Jun 2020 06:02    137    |  104    |  16     ----------------------------<  167    4.0     |  21     |  0.72   05 Jun 2020 12:38    138    |  110    |  18     ----------------------------<  157    4.6     |  20     |  0.77     Ca    9.1        06 Jun 2020 06:02  Ca    9.2        05 Jun 2020 12:38    TPro  6.6    /  Alb  3.7    /  TBili  0.2    /  DBili  x      /  AST  22     /  ALT  22     /  AlkPhos  80     06 Jun 2020 06:02  TPro  7.6    /  Alb  3.4    /  TBili  0.4    /  DBili  x      /  AST  33     /  ALT  28     /  AlkPhos  89     05 Jun 2020 12:38    PT/INR - ( 05 Jun 2020 12:02 )   PT: 11.8 sec;   INR: 1.05 ratio         PTT - ( 05 Jun 2020 12:02 )  PTT:38.0 sec  CARDIAC MARKERS ( 05 Jun 2020 12:38 )  .630 ng/mL / x     / 167 U/L / x     / x          Blood Culture:

## 2020-06-07 NOTE — PROGRESS NOTE ADULT - ASSESSMENT
74 y/o  female with PMHx of anxiety, depression presented to [facility] ER yesterday c/o chest pain, NSTEMI, s/p PCI to the diagonal, tolerated well    - Continue telemetry  - Continue dAPT with aspirin and Plavix  - Continue statin drug  - Increase metoprolol to 50 bid  - NO tele events  - Start losartan 25 QD  - Check echocardiogram  - To follow

## 2020-06-08 ENCOUNTER — TRANSCRIPTION ENCOUNTER (OUTPATIENT)
Age: 75
End: 2020-06-08

## 2020-06-08 VITALS — DIASTOLIC BLOOD PRESSURE: 93 MMHG | SYSTOLIC BLOOD PRESSURE: 149 MMHG | HEART RATE: 80 BPM

## 2020-06-08 PROCEDURE — 93454 CORONARY ARTERY ANGIO S&I: CPT | Mod: 59

## 2020-06-08 PROCEDURE — 99232 SBSQ HOSP IP/OBS MODERATE 35: CPT

## 2020-06-08 PROCEDURE — C1887: CPT

## 2020-06-08 PROCEDURE — C1769: CPT

## 2020-06-08 PROCEDURE — 82553 CREATINE MB FRACTION: CPT

## 2020-06-08 PROCEDURE — C1874: CPT

## 2020-06-08 PROCEDURE — C1894: CPT

## 2020-06-08 PROCEDURE — 93306 TTE W/DOPPLER COMPLETE: CPT | Mod: 26

## 2020-06-08 PROCEDURE — 82550 ASSAY OF CK (CPK): CPT

## 2020-06-08 PROCEDURE — C1725: CPT

## 2020-06-08 PROCEDURE — 99153 MOD SED SAME PHYS/QHP EA: CPT

## 2020-06-08 PROCEDURE — 80053 COMPREHEN METABOLIC PANEL: CPT

## 2020-06-08 PROCEDURE — 85027 COMPLETE CBC AUTOMATED: CPT

## 2020-06-08 PROCEDURE — 84484 ASSAY OF TROPONIN QUANT: CPT

## 2020-06-08 PROCEDURE — 99152 MOD SED SAME PHYS/QHP 5/>YRS: CPT

## 2020-06-08 PROCEDURE — 93306 TTE W/DOPPLER COMPLETE: CPT

## 2020-06-08 PROCEDURE — C9606: CPT | Mod: LD

## 2020-06-08 PROCEDURE — 93005 ELECTROCARDIOGRAM TRACING: CPT

## 2020-06-08 RX ORDER — ATORVASTATIN CALCIUM 80 MG/1
1 TABLET, FILM COATED ORAL
Qty: 30 | Refills: 0
Start: 2020-06-08 | End: 2020-07-07

## 2020-06-08 RX ORDER — METOPROLOL TARTRATE 50 MG
1 TABLET ORAL
Qty: 60 | Refills: 0
Start: 2020-06-08 | End: 2020-07-07

## 2020-06-08 RX ORDER — CLOPIDOGREL BISULFATE 75 MG/1
1 TABLET, FILM COATED ORAL
Qty: 30 | Refills: 0
Start: 2020-06-08 | End: 2020-07-07

## 2020-06-08 RX ORDER — LOSARTAN POTASSIUM 100 MG/1
1 TABLET, FILM COATED ORAL
Qty: 30 | Refills: 0
Start: 2020-06-08 | End: 2020-07-07

## 2020-06-08 RX ORDER — NORTRIPTYLINE HYDROCHLORIDE 10 MG/1
2 CAPSULE ORAL
Qty: 0 | Refills: 0 | DISCHARGE
Start: 2020-06-08

## 2020-06-08 RX ORDER — ERYTHROMYCIN BASE 5 MG/GRAM
1 OINTMENT (GRAM) OPHTHALMIC (EYE)
Qty: 0 | Refills: 0 | DISCHARGE
Start: 2020-06-08

## 2020-06-08 RX ORDER — ASPIRIN/CALCIUM CARB/MAGNESIUM 324 MG
1 TABLET ORAL
Qty: 30 | Refills: 0
Start: 2020-06-08 | End: 2020-07-07

## 2020-06-08 RX ADMIN — Medication 1 APPLICATION(S): at 10:26

## 2020-06-08 RX ADMIN — Medication 50 MILLIGRAM(S): at 16:38

## 2020-06-08 RX ADMIN — CLOPIDOGREL BISULFATE 75 MILLIGRAM(S): 75 TABLET, FILM COATED ORAL at 10:24

## 2020-06-08 RX ADMIN — LOSARTAN POTASSIUM 25 MILLIGRAM(S): 100 TABLET, FILM COATED ORAL at 05:46

## 2020-06-08 RX ADMIN — Medication 50 MILLIGRAM(S): at 05:46

## 2020-06-08 RX ADMIN — ENOXAPARIN SODIUM 40 MILLIGRAM(S): 100 INJECTION SUBCUTANEOUS at 10:24

## 2020-06-08 RX ADMIN — Medication 1 APPLICATION(S): at 16:39

## 2020-06-08 RX ADMIN — Medication 81 MILLIGRAM(S): at 10:24

## 2020-06-08 NOTE — PROGRESS NOTE ADULT - SUBJECTIVE AND OBJECTIVE BOX
[facility] - Joni Ace, Emelyn, Karin, Yuri, Gabe Lewis  Office Number:  [phone]    Patient resting comfortably in bed in NAD.  Laying flat with no respiratory distress.  No complaints of chest pain, dyspnea, palpitations, PND, or orthopnea.    ROS: negative unless otherwise mentioned.    Telemetry:  sr 60-70    MEDICATIONS  (STANDING):  aspirin enteric coated 81 milliGRAM(s) Oral daily  atorvastatin 40 milliGRAM(s) Oral at bedtime  clonazePAM  Tablet 0.25 milliGRAM(s) Oral at bedtime  clopidogrel Tablet 75 milliGRAM(s) Oral daily  enoxaparin Injectable 40 milliGRAM(s) SubCutaneous daily  erythromycin   Ointment 1 Application(s) Left EYE four times a day  losartan 25 milliGRAM(s) Oral daily  metoprolol tartrate 50 milliGRAM(s) Oral two times a day  nortriptyline 100 milliGRAM(s) Oral at bedtime    MEDICATIONS  (PRN):      Allergies    No Known Allergies    Intolerances        Vital Signs Last 24 Hrs  T(C): 36.3 (08 Jun 2020 05:05), Max: 36.9 (07 Jun 2020 20:23)  T(F): 97.4 (08 Jun 2020 05:05), Max: 98.4 (07 Jun 2020 20:23)  HR: 77 (08 Jun 2020 05:05) (67 - 84)  BP: 165/90 (08 Jun 2020 05:05) (103/64 - 165/90)  BP(mean): --  RR: 17 (08 Jun 2020 05:05) (17 - 18)  SpO2: 96% (08 Jun 2020 05:05) (95% - 98%)    I&O's Summary    07 Jun 2020 07:01  -  08 Jun 2020 07:00  --------------------------------------------------------  IN: 1140 mL / OUT: 0 mL / NET: 1140 mL        ON EXAM:    General: NAD, awake and alert, oriented x 3  HEENT: Mucous membranes are moist, anicteric  Lungs: Non-labored, breath sounds are clear bilaterally, No wheezing, rales or rhonchi  Cardiovascular: Regular, S1 and S2, no murmurs, rubs, or gallops  Gastrointestinal: Bowel Sounds present, soft, nontender.   Lymph: No peripheral edema. No lymphadenopathy.  Skin: No rashes or ulcers  Psych:  Mood & affect appropriate    LABS: All Labs Reviewed:                        14.4   9.65  )-----------( 276      ( 07 Jun 2020 06:28 )             43.8                         14.4   11.43 )-----------( 287      ( 06 Jun 2020 06:02 )             43.8                         15.5   10.64 )-----------( 325      ( 05 Jun 2020 12:02 )             45.6     06 Jun 2020 06:02    137    |  104    |  16     ----------------------------<  167    4.0     |  21     |  0.72   05 Jun 2020 12:38    138    |  110    |  18     ----------------------------<  157    4.6     |  20     |  0.77     Ca    9.1        06 Jun 2020 06:02  Ca    9.2        05 Jun 2020 12:38    TPro  6.6    /  Alb  3.7    /  TBili  0.2    /  DBili  x      /  AST  22     /  ALT  22     /  AlkPhos  80     06 Jun 2020 06:02  TPro  7.6    /  Alb  3.4    /  TBili  0.4    /  DBili  x      /  AST  33     /  ALT  28     /  AlkPhos  89     05 Jun 2020 12:38      CARDIAC MARKERS ( 07 Jun 2020 09:21 )  x     / x     / 97 U/L / x     / 2.8 ng/mL      Blood Culture:

## 2020-06-08 NOTE — DISCHARGE NOTE PROVIDER - CARE PROVIDER_API CALL
Forrest Frazier  CARDIOVASCULAR DISEASE  [address] [place] [address]  Phone: [phone]  Fax: [phone]  Follow Up Time: 2 weeks

## 2020-06-08 NOTE — DISCHARGE NOTE NURSING/CASE MANAGEMENT/SOCIAL WORK - PATIENT PORTAL LINK FT
You can access the FollowMyHealth Patient Portal offered by [facility] by registering at the following website: [email or web address]. By joining [facility]’s FollowMyHealth portal, you will also be able to view your health information using other applications (apps) compatible with our system.

## 2020-06-08 NOTE — PHARMACOTHERAPY INTERVENTION NOTE - COMMENTS
Spoke to patient about indication, directions, and side effect profile of medications. Discussed dual antiplatelet therapy with aspirin and clopidogrel, new beta-blocker (metoprolol), and new angiotensin receptor blocker (losartan). Discussed importance of adhering to medications in order to prevent future cardiovascular events.    Nicho Cotton, PharmJANAK  Pager number: [phone]

## 2020-06-08 NOTE — DISCHARGE NOTE PROVIDER - NSDCMRMEDTOKEN_GEN_ALL_CORE_FT
aspirin 81 mg oral delayed release tablet: 1 tab(s) orally once a day  atorvastatin 40 mg oral tablet: 1 tab(s) orally once a day (at bedtime)  clopidogrel 75 mg oral tablet: 1 tab(s) orally once a day  erythromycin 0.5% ophthalmic ointment: 1 application to each affected eye 4 times a day  KlonoPIN 0.25 mg oral tablet: 1 tab(s) orally once a day (at bedtime)  losartan 25 mg oral tablet: 1 tab(s) orally once a day  metoprolol tartrate 50 mg oral tablet: 1 tab(s) orally 2 times a day  nortriptyline 50 mg oral capsule: 2 cap(s) orally once a day (at bedtime)

## 2020-06-08 NOTE — DISCHARGE NOTE PROVIDER - HOSPITAL COURSE
4 y/o  female with PMHx of anxiety, depression presented to [facility] ER on friday c/o chest pain, NSTEMI, s/p PCI to the diagonal, tolerated well        - no events on telemetry    - Continue DAPT with aspirin and Plavix    - Continue statin drug    - continue metoprolol to 50 bid    - can increase losartan to 50 daily if BP remains elevated    - s/p echocardiogram, reviewed by Dr. Bernal    - To follow with you.    - can plan for d/c home today if echocardiogram unremarkable. She will follow up with Dr. Frazier in our office in 2 weeks.        Pt stable dc home with outpatient follow up with Cardiologist within 2 weeks.

## 2020-06-08 NOTE — PROGRESS NOTE ADULT - ASSESSMENT
76 y/o  female with PMHx of anxiety, depression presented to [facility] ER on friday c/o chest pain, NSTEMI, s/p PCI to the diagonal, tolerated well    - no events on telemetry  - Continue DAPT with aspirin and Plavix  - Continue statin drug  - continue metoprolol to 50 bid  - can increase losartan to 50 daily if BP remains elevated  - Check echocardiogram  - To follow with you.  - can plan for d/c home today if echocardiogram unremarkable. She will follow up with Dr. Frazier in our office in 2 weeks.

## 2020-06-08 NOTE — DISCHARGE NOTE PROVIDER - NSDCCPCAREPLAN_GEN_ALL_CORE_FT
PRINCIPAL DISCHARGE DIAGNOSIS  Diagnosis: Non-ST elevation MI (NSTEMI)  Assessment and Plan of Treatment: Patient underwent cardia catheterization.   Please follow up with Cardiologist within 2 weeks of discharge.   Please take your medications as ordered.

## 2020-06-10 PROBLEM — Z00.00 ENCOUNTER FOR PREVENTIVE HEALTH EXAMINATION: Status: ACTIVE | Noted: 2020-06-10

## 2020-06-24 ENCOUNTER — APPOINTMENT (OUTPATIENT)
Dept: CARDIOLOGY | Facility: CLINIC | Age: 75
End: 2020-06-24
Payer: MEDICARE

## 2020-06-24 VITALS
WEIGHT: 171 LBS | OXYGEN SATURATION: 99 % | HEART RATE: 98 BPM | DIASTOLIC BLOOD PRESSURE: 94 MMHG | SYSTOLIC BLOOD PRESSURE: 162 MMHG | HEIGHT: 64.5 IN | BODY MASS INDEX: 28.84 KG/M2

## 2020-06-24 DIAGNOSIS — Z87.891 PERSONAL HISTORY OF NICOTINE DEPENDENCE: ICD-10-CM

## 2020-06-24 PROCEDURE — 93000 ELECTROCARDIOGRAM COMPLETE: CPT

## 2020-06-24 PROCEDURE — 99214 OFFICE O/P EST MOD 30 MIN: CPT

## 2020-06-24 RX ORDER — NORTRIPTYLINE HYDROCHLORIDE 75 MG/1
CAPSULE ORAL
Refills: 0 | Status: ACTIVE | COMMUNITY

## 2020-06-24 RX ORDER — ASPIRIN 81 MG
81 TABLET, DELAYED RELEASE (ENTERIC COATED) ORAL
Refills: 0 | Status: DISCONTINUED | COMMUNITY
End: 2020-06-24

## 2020-06-24 NOTE — HISTORY OF PRESENT ILLNESS
[FreeTextEntry1] : Ambreen presented to the office today for a cardiovascular evaluation. He recently saw her in the hospital.\par \par She is now 75 years old, with a history of anxiety and depression. She has been taking nortriptyline and Klonopin. She has not seen doctors frequently. She presented to the hospital June 5 with chest discomfort and elevated troponin. She was transferred for cardiac catheterization, was found to have an occluded diagonal branch. She had PCI. Her ejection fraction post MI was normal. She was discharged.\par \par She has been feeling well. She does not report symptoms of angina, heart failure or arrhythmia. She has been anxious. She does not check her blood pressure at home.

## 2020-06-24 NOTE — PHYSICAL EXAM
[General Appearance - Well Developed] : well developed [General Appearance - Well Nourished] : well nourished [Well Groomed] : well groomed [Normal Appearance] : normal appearance [No Deformities] : no deformities [General Appearance - In No Acute Distress] : no acute distress [Normal Conjunctiva] : the conjunctiva exhibited no abnormalities [Normal Oral Mucosa] : normal oral mucosa [Eyelids - No Xanthelasma] : the eyelids demonstrated no xanthelasmas [No Oral Pallor] : no oral pallor [No Oral Cyanosis] : no oral cyanosis [Normal Jugular Venous A Waves Present] : normal jugular venous A waves present [No Jugular Venous Maxwell A Waves] : no jugular venous maxwell A waves [Normal Jugular Venous V Waves Present] : normal jugular venous V waves present [Exaggerated Use Of Accessory Muscles For Inspiration] : no accessory muscle use [Respiration, Rhythm And Depth] : normal respiratory rhythm and effort [Rhythm Regular] : regular [Normal Rate] : normal [Auscultation Breath Sounds / Voice Sounds] : lungs were clear to auscultation bilaterally [Normal S1] : normal S1 [Normal S2] : normal S2 [S3] : no S3 [S4] : no S4 [No Murmur] : no murmurs heard [Right Carotid Bruit] : no bruit heard over the right carotid [Left Carotid Bruit] : no bruit heard over the left carotid [Right Femoral Bruit] : no bruit heard over the right femoral artery [Left Femoral Bruit] : no bruit heard over the left femoral artery [2+] : right 2+ [No Pitting Edema] : no pitting edema present [Abdomen Soft] : soft [Abdomen Tenderness] : non-tender [Abdomen Mass (___ Cm)] : no abdominal mass palpated [Abnormal Walk] : normal gait [Gait - Sufficient For Exercise Testing] : the gait was sufficient for exercise testing [Nail Clubbing] : no clubbing of the fingernails [Petechial Hemorrhages (___cm)] : no petechial hemorrhages [Cyanosis, Localized] : no localized cyanosis [No Venous Stasis] : no venous stasis [Skin Color & Pigmentation] : normal skin color and pigmentation [] : no rash [No Skin Ulcers] : no skin ulcer [Skin Lesions] : no skin lesions [No Xanthoma] : no  xanthoma was observed [Oriented To Time, Place, And Person] : oriented to person, place, and time [Affect] : the affect was normal [No Anxiety] : not feeling anxious [Mood] : the mood was normal

## 2020-06-24 NOTE — REASON FOR VISIT
[Hyperlipidemia] : hyperlipidemia [Coronary Artery Disease] : coronary artery disease [Hypertension] : hypertension

## 2020-07-20 ENCOUNTER — APPOINTMENT (OUTPATIENT)
Dept: CARDIOLOGY | Facility: CLINIC | Age: 75
End: 2020-07-20
Payer: MEDICARE

## 2020-07-20 PROCEDURE — 93880 EXTRACRANIAL BILAT STUDY: CPT

## 2020-08-13 ENCOUNTER — APPOINTMENT (OUTPATIENT)
Dept: CARDIOLOGY | Facility: CLINIC | Age: 75
End: 2020-08-13
Payer: MEDICARE

## 2020-08-13 ENCOUNTER — NON-APPOINTMENT (OUTPATIENT)
Age: 75
End: 2020-08-13

## 2020-08-13 VITALS
HEIGHT: 64.5 IN | HEART RATE: 95 BPM | DIASTOLIC BLOOD PRESSURE: 97 MMHG | SYSTOLIC BLOOD PRESSURE: 164 MMHG | WEIGHT: 169 LBS | OXYGEN SATURATION: 95 % | BODY MASS INDEX: 28.5 KG/M2

## 2020-08-13 PROCEDURE — 99214 OFFICE O/P EST MOD 30 MIN: CPT

## 2020-08-13 PROCEDURE — 93000 ELECTROCARDIOGRAM COMPLETE: CPT

## 2020-08-13 NOTE — DISCUSSION/SUMMARY
[FreeTextEntry1] : She is feeling well following her recent presentation with an acute coronary syndrome. Her blood pressure is elevated, and she will increase losartan 200 mg daily. She will try taking metoprolol in the evenings, to see if that positively impact her fatigue. She will get blood work done either tomorrow morning or next week. She will see me in 6 weeks. She will continue to check her blood pressure in the meantime.

## 2020-08-13 NOTE — PHYSICAL EXAM
[General Appearance - Well Developed] : well developed [Well Groomed] : well groomed [Normal Appearance] : normal appearance [General Appearance - Well Nourished] : well nourished [No Deformities] : no deformities [Normal Conjunctiva] : the conjunctiva exhibited no abnormalities [General Appearance - In No Acute Distress] : no acute distress [Eyelids - No Xanthelasma] : the eyelids demonstrated no xanthelasmas [Normal Oral Mucosa] : normal oral mucosa [No Oral Pallor] : no oral pallor [No Oral Cyanosis] : no oral cyanosis [Normal Jugular Venous A Waves Present] : normal jugular venous A waves present [No Jugular Venous Maxwell A Waves] : no jugular venous maxwell A waves [Normal Jugular Venous V Waves Present] : normal jugular venous V waves present [Respiration, Rhythm And Depth] : normal respiratory rhythm and effort [Exaggerated Use Of Accessory Muscles For Inspiration] : no accessory muscle use [Auscultation Breath Sounds / Voice Sounds] : lungs were clear to auscultation bilaterally [Abdomen Soft] : soft [Abdomen Tenderness] : non-tender [Abdomen Mass (___ Cm)] : no abdominal mass palpated [Abnormal Walk] : normal gait [Gait - Sufficient For Exercise Testing] : the gait was sufficient for exercise testing [Petechial Hemorrhages (___cm)] : no petechial hemorrhages [Nail Clubbing] : no clubbing of the fingernails [Cyanosis, Localized] : no localized cyanosis [Skin Color & Pigmentation] : normal skin color and pigmentation [] : no rash [Skin Lesions] : no skin lesions [No Venous Stasis] : no venous stasis [No Skin Ulcers] : no skin ulcer [No Xanthoma] : no  xanthoma was observed [Affect] : the affect was normal [Mood] : the mood was normal [Oriented To Time, Place, And Person] : oriented to person, place, and time [Normal Rate] : normal [No Anxiety] : not feeling anxious [Rhythm Regular] : regular [Normal S1] : normal S1 [Normal S2] : normal S2 [No Murmur] : no murmurs heard [S3] : no S3 [S4] : no S4 [Left Carotid Bruit] : no bruit heard over the left carotid [Right Carotid Bruit] : no bruit heard over the right carotid [Right Femoral Bruit] : no bruit heard over the right femoral artery [Left Femoral Bruit] : no bruit heard over the left femoral artery [No Pitting Edema] : no pitting edema present [2+] : left 2+

## 2020-08-13 NOTE — HISTORY OF PRESENT ILLNESS
[FreeTextEntry1] : Ambreen presented to the office today for a cardiovascular evaluation. I saw her last month.\par \par She is now 75 years old, with a history of anxiety and depression. She has been taking nortriptyline and Klonopin. She has not seen doctors frequently. She presented to the hospital June 5 with chest discomfort and elevated troponin. She was transferred for cardiac catheterization, was found to have an occluded diagonal branch. She had PCI. Her ejection fraction post MI was normal. She was discharged.\par \par She has been feeling well. She does not report symptoms of angina, heart failure or arrhythmia. She has been anxious. She has been checking her blood pressure at home, and it has been elevated. She has been feeling a little bit fatigued during the day, and takes a nap at 2:00, although she used to take a nap at 4. She has a little extra bruisability.

## 2020-08-14 ENCOUNTER — EMERGENCY (EMERGENCY)
Facility: HOSPITAL | Age: 75
LOS: 0 days | Discharge: HOME | End: 2020-08-14
Attending: STUDENT IN AN ORGANIZED HEALTH CARE EDUCATION/TRAINING PROGRAM
Payer: MEDICARE

## 2020-08-14 VITALS
WEIGHT: 164.91 LBS | SYSTOLIC BLOOD PRESSURE: 125 MMHG | HEIGHT: 65 IN | RESPIRATION RATE: 16 BRPM | OXYGEN SATURATION: 95 % | DIASTOLIC BLOOD PRESSURE: 59 MMHG | TEMPERATURE: 100 F | HEART RATE: 87 BPM

## 2020-08-14 VITALS
OXYGEN SATURATION: 95 % | TEMPERATURE: 99 F | DIASTOLIC BLOOD PRESSURE: 57 MMHG | SYSTOLIC BLOOD PRESSURE: 125 MMHG | RESPIRATION RATE: 18 BRPM | HEART RATE: 81 BPM

## 2020-08-14 DIAGNOSIS — F32.9 MAJOR DEPRESSIVE DISORDER, SINGLE EPISODE, UNSPECIFIED: ICD-10-CM

## 2020-08-14 DIAGNOSIS — N39.0 URINARY TRACT INFECTION, SITE NOT SPECIFIED: ICD-10-CM

## 2020-08-14 DIAGNOSIS — I25.10 ATHEROSCLEROTIC HEART DISEASE OF NATIVE CORONARY ARTERY WITHOUT ANGINA PECTORIS: ICD-10-CM

## 2020-08-14 DIAGNOSIS — R50.9 FEVER, UNSPECIFIED: ICD-10-CM

## 2020-08-14 DIAGNOSIS — Z95.5 PRESENCE OF CORONARY ANGIOPLASTY IMPLANT AND GRAFT: ICD-10-CM

## 2020-08-14 DIAGNOSIS — Z79.82 LONG TERM (CURRENT) USE OF ASPIRIN: ICD-10-CM

## 2020-08-14 DIAGNOSIS — I25.2 OLD MYOCARDIAL INFARCTION: ICD-10-CM

## 2020-08-14 DIAGNOSIS — R31.9 HEMATURIA, UNSPECIFIED: ICD-10-CM

## 2020-08-14 DIAGNOSIS — F41.9 ANXIETY DISORDER, UNSPECIFIED: ICD-10-CM

## 2020-08-14 LAB
ALBUMIN SERPL ELPH-MCNC: 3.5 G/DL — SIGNIFICANT CHANGE UP (ref 3.3–5)
ALP SERPL-CCNC: 76 U/L — SIGNIFICANT CHANGE UP (ref 40–120)
ALT FLD-CCNC: 29 U/L — SIGNIFICANT CHANGE UP (ref 12–78)
ANION GAP SERPL CALC-SCNC: 8 MMOL/L — SIGNIFICANT CHANGE UP (ref 5–17)
APPEARANCE UR: ABNORMAL
AST SERPL-CCNC: 12 U/L — LOW (ref 15–37)
BASOPHILS # BLD AUTO: 0.07 K/UL — SIGNIFICANT CHANGE UP (ref 0–0.2)
BASOPHILS NFR BLD AUTO: 0.4 % — SIGNIFICANT CHANGE UP (ref 0–2)
BILIRUB SERPL-MCNC: 0.5 MG/DL — SIGNIFICANT CHANGE UP (ref 0.2–1.2)
BILIRUB UR-MCNC: NEGATIVE — SIGNIFICANT CHANGE UP
BUN SERPL-MCNC: 23 MG/DL — SIGNIFICANT CHANGE UP (ref 7–23)
CALCIUM SERPL-MCNC: 9.1 MG/DL — SIGNIFICANT CHANGE UP (ref 8.5–10.1)
CHLORIDE SERPL-SCNC: 105 MMOL/L — SIGNIFICANT CHANGE UP (ref 96–108)
CO2 SERPL-SCNC: 21 MMOL/L — LOW (ref 22–31)
COLOR SPEC: YELLOW — SIGNIFICANT CHANGE UP
CREAT SERPL-MCNC: 1.12 MG/DL — SIGNIFICANT CHANGE UP (ref 0.5–1.3)
DIFF PNL FLD: ABNORMAL
EOSINOPHIL # BLD AUTO: 0.01 K/UL — SIGNIFICANT CHANGE UP (ref 0–0.5)
EOSINOPHIL NFR BLD AUTO: 0.1 % — SIGNIFICANT CHANGE UP (ref 0–6)
GLUCOSE SERPL-MCNC: 206 MG/DL — HIGH (ref 70–99)
GLUCOSE UR QL: NEGATIVE MG/DL — SIGNIFICANT CHANGE UP
HCT VFR BLD CALC: 41 % — SIGNIFICANT CHANGE UP (ref 34.5–45)
HGB BLD-MCNC: 13.8 G/DL — SIGNIFICANT CHANGE UP (ref 11.5–15.5)
IMM GRANULOCYTES NFR BLD AUTO: 0.4 % — SIGNIFICANT CHANGE UP (ref 0–1.5)
KETONES UR-MCNC: ABNORMAL
LACTATE SERPL-SCNC: 1.5 MMOL/L — SIGNIFICANT CHANGE UP (ref 0.7–2)
LEUKOCYTE ESTERASE UR-ACNC: ABNORMAL
LYMPHOCYTES # BLD AUTO: 1.2 K/UL — SIGNIFICANT CHANGE UP (ref 1–3.3)
LYMPHOCYTES # BLD AUTO: 6.6 % — LOW (ref 13–44)
MCHC RBC-ENTMCNC: 28.5 PG — SIGNIFICANT CHANGE UP (ref 27–34)
MCHC RBC-ENTMCNC: 33.7 GM/DL — SIGNIFICANT CHANGE UP (ref 32–36)
MCV RBC AUTO: 84.5 FL — SIGNIFICANT CHANGE UP (ref 80–100)
MONOCYTES # BLD AUTO: 0.8 K/UL — SIGNIFICANT CHANGE UP (ref 0–0.9)
MONOCYTES NFR BLD AUTO: 4.4 % — SIGNIFICANT CHANGE UP (ref 2–14)
NEUTROPHILS # BLD AUTO: 16.05 K/UL — HIGH (ref 1.8–7.4)
NEUTROPHILS NFR BLD AUTO: 88.1 % — HIGH (ref 43–77)
NITRITE UR-MCNC: POSITIVE
PH UR: 5 — SIGNIFICANT CHANGE UP (ref 5–8)
PLATELET # BLD AUTO: 248 K/UL — SIGNIFICANT CHANGE UP (ref 150–400)
POTASSIUM SERPL-MCNC: 4.2 MMOL/L — SIGNIFICANT CHANGE UP (ref 3.5–5.3)
POTASSIUM SERPL-SCNC: 4.2 MMOL/L — SIGNIFICANT CHANGE UP (ref 3.5–5.3)
PROT SERPL-MCNC: 7.8 GM/DL — SIGNIFICANT CHANGE UP (ref 6–8.3)
PROT UR-MCNC: 30 MG/DL
RBC # BLD: 4.85 M/UL — SIGNIFICANT CHANGE UP (ref 3.8–5.2)
RBC # FLD: 12.5 % — SIGNIFICANT CHANGE UP (ref 10.3–14.5)
SODIUM SERPL-SCNC: 134 MMOL/L — LOW (ref 135–145)
SP GR SPEC: 1.02 — SIGNIFICANT CHANGE UP (ref 1.01–1.02)
UROBILINOGEN FLD QL: NEGATIVE MG/DL — SIGNIFICANT CHANGE UP
WBC # BLD: 18.2 K/UL — HIGH (ref 3.8–10.5)
WBC # FLD AUTO: 18.2 K/UL — HIGH (ref 3.8–10.5)

## 2020-08-14 PROCEDURE — 83605 ASSAY OF LACTIC ACID: CPT

## 2020-08-14 PROCEDURE — U0003: CPT

## 2020-08-14 PROCEDURE — 71046 X-RAY EXAM CHEST 2 VIEWS: CPT

## 2020-08-14 PROCEDURE — 36415 COLL VENOUS BLD VENIPUNCTURE: CPT

## 2020-08-14 PROCEDURE — 81001 URINALYSIS AUTO W/SCOPE: CPT

## 2020-08-14 PROCEDURE — 71046 X-RAY EXAM CHEST 2 VIEWS: CPT | Mod: 26

## 2020-08-14 PROCEDURE — 99283 EMERGENCY DEPT VISIT LOW MDM: CPT | Mod: 25

## 2020-08-14 PROCEDURE — 80053 COMPREHEN METABOLIC PANEL: CPT

## 2020-08-14 PROCEDURE — 99283 EMERGENCY DEPT VISIT LOW MDM: CPT

## 2020-08-14 PROCEDURE — 85025 COMPLETE CBC W/AUTO DIFF WBC: CPT

## 2020-08-14 RX ORDER — CEPHALEXIN 500 MG
500 CAPSULE ORAL ONCE
Refills: 0 | Status: COMPLETED | OUTPATIENT
Start: 2020-08-14 | End: 2020-08-14

## 2020-08-14 RX ORDER — CEPHALEXIN 500 MG
1 CAPSULE ORAL
Qty: 21 | Refills: 0
Start: 2020-08-14 | End: 2020-08-20

## 2020-08-14 RX ORDER — ACETAMINOPHEN 500 MG
650 TABLET ORAL ONCE
Refills: 0 | Status: COMPLETED | OUTPATIENT
Start: 2020-08-14 | End: 2020-08-14

## 2020-08-14 RX ADMIN — Medication 500 MILLIGRAM(S): at 15:49

## 2020-08-14 RX ADMIN — Medication 650 MILLIGRAM(S): at 15:12

## 2020-08-14 NOTE — ED ADULT TRIAGE NOTE - CHIEF COMPLAINT QUOTE
Pt was brought in by ambulance for evaluation of low blood pressure at urgent care, headache and fever, chills for evaluation of viral illness. On arrival and while with EMS no hypotension. Pt is AAOx3 and no c/o dizziness.

## 2020-08-14 NOTE — ED STATDOCS - NSPTACCESSSVCSAPPTDETAILS_ED_ALL_ED_FT
Your PMD in 1-2 days     Call for COVID results   patient presents with  concern for COVID exposure.  As patient is nontoxic appearing will test for COVID and d/c.  Quarantine reviewed and return precautions reviewed.

## 2020-08-14 NOTE — ED STATDOCS - PHYSICAL EXAMINATION
GENERALIZED APPEARANCE:  Comfortable, no acute distress.  SKIN:  Warm and dry.  HEAD:  Normocephalic. No facial tenderness. No OP exudates.   EYES:  Conjunctiva pink, no icterus.  ENMT:  Mucus membranes moist. +mild nasal congestion  CHEST AND RESPIRATORY:  Clear to auscultation with good air entry bilaterally.  HEART AND CARDIOVASCULAR:  Regular rate, no obvious murmur.  ABDOMEN AND GI:  Soft, non-tender, non-distended.  No rebound, no guarding.  EXTREMITIES:  No deformity, edema, or calf tenderness.  NEURO: AAOx3, gross motor and sensory intact.  PSYCH: Normal affect.

## 2020-08-14 NOTE — ED STATDOCS - PRINCIPAL DIAGNOSIS
Urinary tract infection without hematuria, site unspecified Urinary tract infection with hematuria, site unspecified

## 2020-08-14 NOTE — ED ADULT NURSE NOTE - NSIMPLEMENTINTERV_GEN_ALL_ED
Implemented All Universal Safety Interventions:  [place] to call system. Call bell, personal items and telephone within reach. Instruct patient to call for assistance. Room bathroom lighting operational. Non-slip footwear when patient is off stretcher. Physically safe environment: no spills, clutter or unnecessary equipment. Stretcher in lowest position, wheels locked, appropriate side rails in place.

## 2020-08-14 NOTE — ED STATDOCS - PROGRESS NOTE DETAILS
Patient seen and evaluated in intake with ED Attending,  ED attending note and orders reviewed, will continue with patient follow up and care -Radha Flores PA-C pt aware of UA results and will treat with keflex advised to fu with pmd and return to ED for any worsening of symptoms. pt agrees with plan and well appearing on dc. -Radha Flores PA-C

## 2020-08-14 NOTE — ED STATDOCS - NS_ ATTENDINGSCRIBEDETAILS _ED_A_ED_FT
I, Lesvia Carlton DO,  performed the initial face to face bedside interview with this patient regarding history of present illness, review of symptoms and relevant past medical, social and family history. I completed an independent physical examination. I was the initial provider who evaluated this patient.    The history, relevant review of systems, past medical and surgical history, medical decision making, and physical examination was documented by the scribe in my presence and I attest to the accuracy of the documentation.

## 2020-08-14 NOTE — ED STATDOCS - CARE PLAN
Principal Discharge DX:	Urinary tract infection without hematuria, site unspecified Principal Discharge DX:	Urinary tract infection with hematuria, site unspecified

## 2020-08-14 NOTE — ED STATDOCS - ATTENDING CONTRIBUTION TO CARE
I, Lesvia Carlton DO, personally saw the patient with ACP.  I have personally performed a face to face diagnostic evaluation on this patient.  I have reviewed the ACP note and agree with the history, exam, and plan of care, except as noted.

## 2020-08-14 NOTE — ED STATDOCS - NS ED ROS FT
Constitutional: +fever, chills.   Eyes: No vision changes.    Ears, Nose, Mouth, Throat: No sore throat.  Cardiovascular: No chest pain.  Respiratory: No difficulty breathing.  Gastrointestinal: No nausea or vomiting.  Genitourinary: No dysuria.  Musculoskeletal: No joint pain.  Integumentary (skin and/or breast): No rash.  Neurological: +headache.  Psychiatric: No depression.  Endocrine:   No heat / cold intolerance.  Hematologic/Lymphatic: No easy bruising    Allergic/Immunologic:   No current allergic reactions.

## 2020-08-14 NOTE — ED STATDOCS - PATIENT PORTAL LINK FT
You can access the FollowMyHealth Patient Portal offered by [facility] by registering at the following website: [email or web address]. By joining [facility] [facility]’s FollowMyHealth portal, you will also be able to view your health information using other applications (apps) compatible with our system.

## 2020-08-15 LAB — SARS-COV-2 RNA SPEC QL NAA+PROBE: SIGNIFICANT CHANGE UP

## 2020-08-25 LAB
ALBUMIN SERPL ELPH-MCNC: 4.2 G/DL
ALP BLD-CCNC: 80 U/L
ALT SERPL-CCNC: 20 U/L
ANION GAP SERPL CALC-SCNC: 11 MMOL/L
AST SERPL-CCNC: 14 U/L
BASOPHILS # BLD AUTO: 0.11 K/UL
BASOPHILS NFR BLD AUTO: 1.1 %
BILIRUB SERPL-MCNC: 0.3 MG/DL
BUN SERPL-MCNC: 17 MG/DL
CALCIUM SERPL-MCNC: 9.6 MG/DL
CHLORIDE SERPL-SCNC: 104 MMOL/L
CHOLEST SERPL-MCNC: 122 MG/DL
CHOLEST/HDLC SERPL: 2.9 RATIO
CO2 SERPL-SCNC: 24 MMOL/L
CREAT SERPL-MCNC: 0.87 MG/DL
EOSINOPHIL # BLD AUTO: 0.62 K/UL
EOSINOPHIL NFR BLD AUTO: 6.3 %
ESTIMATED AVERAGE GLUCOSE: 194 MG/DL
GLUCOSE SERPL-MCNC: 189 MG/DL
HBA1C MFR BLD HPLC: 8.4 %
HCT VFR BLD CALC: 43.9 %
HDLC SERPL-MCNC: 43 MG/DL
HGB BLD-MCNC: 14.1 G/DL
IMM GRANULOCYTES NFR BLD AUTO: 0.4 %
LDLC SERPL CALC-MCNC: 64 MG/DL
LYMPHOCYTES # BLD AUTO: 2.41 K/UL
LYMPHOCYTES NFR BLD AUTO: 24.7 %
MAN DIFF?: NORMAL
MCHC RBC-ENTMCNC: 28.3 PG
MCHC RBC-ENTMCNC: 32.1 GM/DL
MCV RBC AUTO: 88.2 FL
MONOCYTES # BLD AUTO: 0.61 K/UL
MONOCYTES NFR BLD AUTO: 6.2 %
NEUTROPHILS # BLD AUTO: 5.98 K/UL
NEUTROPHILS NFR BLD AUTO: 61.3 %
PLATELET # BLD AUTO: 346 K/UL
POTASSIUM SERPL-SCNC: 4.3 MMOL/L
PROT SERPL-MCNC: 7.1 G/DL
RBC # BLD: 4.98 M/UL
RBC # FLD: 12.9 %
SODIUM SERPL-SCNC: 139 MMOL/L
TRIGL SERPL-MCNC: 76 MG/DL
TSH SERPL-ACNC: 1.93 UIU/ML
WBC # FLD AUTO: 9.77 K/UL

## 2020-09-08 ENCOUNTER — APPOINTMENT (OUTPATIENT)
Dept: ENDOCRINOLOGY | Facility: CLINIC | Age: 75
End: 2020-09-08
Payer: MEDICARE

## 2020-09-08 ENCOUNTER — RESULT CHARGE (OUTPATIENT)
Age: 75
End: 2020-09-08

## 2020-09-08 PROCEDURE — 82962 GLUCOSE BLOOD TEST: CPT

## 2020-09-08 PROCEDURE — 99205 OFFICE O/P NEW HI 60 MIN: CPT | Mod: 25

## 2020-09-08 RX ORDER — BLOOD-GLUCOSE METER
70 EACH MISCELLANEOUS
Qty: 2 | Refills: 2 | Status: ACTIVE | COMMUNITY
Start: 2020-09-08 | End: 1900-01-01

## 2020-09-08 RX ORDER — BLOOD SUGAR DIAGNOSTIC
STRIP MISCELLANEOUS
Qty: 180 | Refills: 2 | Status: ACTIVE | COMMUNITY
Start: 2020-09-08 | End: 1900-01-01

## 2020-09-08 RX ORDER — LANCETS 33 GAUGE
EACH MISCELLANEOUS
Qty: 2 | Refills: 2 | Status: ACTIVE | COMMUNITY
Start: 2020-09-08 | End: 1900-01-01

## 2020-09-08 RX ORDER — BLOOD-GLUCOSE METER
W/DEVICE EACH MISCELLANEOUS
Qty: 1 | Refills: 0 | Status: ACTIVE | COMMUNITY
Start: 2020-09-08 | End: 1900-01-01

## 2020-09-08 NOTE — REASON FOR VISIT
[Initial Evaluation] : an initial evaluation [DM Type 2] : DM Type 2 [FreeTextEntry2] : Forrest Frazier

## 2020-09-08 NOTE — ASSESSMENT
[FreeTextEntry1] : 75 year old female with past medical history of Diabetes Mellitus Type 2, CAD s/p FRANCISCO J (6/2020)  who presents for management of her diabetes\par \par 1. DM 2- uncontrolled, uncomplicated\par Newly diagnosed. Patient counseled on the importance of diabetic control and complications. Patient's diet and the necessary changes discussed. Reviewed over glycemic goals.\par \par Cont Metformin 500 mg QD\par Start to Check fsg  BID\par Cont diabetic diet\par Cont exercise\par will check microalbumin at follow-up\par Opthalmology Visit needs to follow up\par Foot Exam up to date\par \par 2. HLD- stable\par Well controlled on atorvastatin\par \par 3.HTN\par Still uncontrolled\par Cont Losartan and Metoprolol. May need inc dose\par \par Follow up in 3 months\par \par \par

## 2020-09-08 NOTE — PHYSICAL EXAM
[Alert] : alert [Well Developed] : well developed [Well Nourished] : well nourished [No Acute Distress] : no acute distress [Normal Sclera/Conjunctiva] : normal sclera/conjunctiva [EOMI] : extra ocular movement intact [No Proptosis] : no proptosis [Thyroid Not Enlarged] : the thyroid was not enlarged [Normal Oropharynx] : the oropharynx was normal [Clear to Auscultation] : lungs were clear to auscultation bilaterally [No Thyroid Nodules] : no palpable thyroid nodules [No Accessory Muscle Use] : no accessory muscle use [No Respiratory Distress] : no respiratory distress [Normal Rate] : heart rate was normal [Normal S1, S2] : normal S1 and S2 [Regular Rhythm] : with a regular rhythm [Normal Bowel Sounds] : normal bowel sounds [Pedal Pulses Normal] : the pedal pulses are present [No Edema] : no peripheral edema [Not Tender] : non-tender [Soft] : abdomen soft [Not Distended] : not distended [Normal Anterior Cervical Nodes] : no anterior cervical lymphadenopathy [Normal Posterior Cervical Nodes] : no posterior cervical lymphadenopathy [No Spinal Tenderness] : no spinal tenderness [Spine Straight] : spine straight [Normal Gait] : normal gait [No Stigmata of Cushings Syndrome] : no stigmata of Cushings Syndrome [Normal Strength/Tone] : muscle strength and tone were normal [Acanthosis Nigricans] : no acanthosis nigricans [No Rash] : no rash [Normal] : normal [Full ROM] : with full range of motion [Diminished Throughout Both Feet] : normal tactile sensation with monofilament testing throughout both feet [Normal Reflexes] : deep tendon reflexes were 2+ and symmetric [No Tremors] : no tremors [Oriented x3] : oriented to person, place, and time

## 2020-09-08 NOTE — HISTORY OF PRESENT ILLNESS
[FreeTextEntry1] : Ms. DALIA GREENE  is a 75 year old female with past medical history of Diabetes Mellitus Type 2, CAD s/p FRNACISCO J (6/2020)  who presents for management of her diabetes. Patient recently presented to hospital and was found to have an MI requiring stent placement. She has been closely followed due to hypertension but also recently diagnosed with diabetes. Patient denies any polyuria, polydipsia and numbness/tingling in the extremities. She has been having blurry vision.\par \par \par POCT Glucose: 120 mg/dL, patient had lunch 3 hours ago\par POCT Hga1c: %\par \par \par Diabetes first diagnosed:8/2020\par Type:2\par \par Current diabetic regimen:\par Metformin 500 mg QD\par Other relevant medications:\par \par Self monitoring blood glucose : Patient does not have a meter\par \par SMBG:\par Pre-breakfast:\par Pre-lunch:\par Pre-dinner:\par bedtime:\par \par Hypoglycemia:\par \par \par Diet:\par Breakfast: english muffin with jam\par Lunch: half a roll with hot dog, egg and ham sandwich\par Dinner: meat, veggies, baked potato\par Snacks: ice cream \par \par Exercise:\par \par Urine micro:NA\par lipid profile:LDL 64 (8/2020)\par last hba1c: 8.4% (8/2020)\par eye exam: none\par diabetic foot exam/podiatry:none\par \par \par \par \par

## 2020-09-08 NOTE — CONSULT LETTER
[Consult Letter:] : I had the pleasure of evaluating your patient, [unfilled]. [Dear  ___] : Dear  [unfilled], [Consult Closing:] : Thank you very much for allowing me to participate in the care of this patient.  If you have any questions, please do not hesitate to contact me. [Sincerely,] : Sincerely, [Please see my note below.] : Please see my note below. [FreeTextEntry3] : Dalila Ocasio MS. DO.\par Endocrinology, Diabetes and Metabolism\par [address] [place] [address]\[place] [address]\par Tel [phone]\par Fax [phone]\par

## 2020-09-10 ENCOUNTER — APPOINTMENT (OUTPATIENT)
Dept: ENDOCRINOLOGY | Facility: CLINIC | Age: 75
End: 2020-09-10
Payer: MEDICARE

## 2020-09-10 PROCEDURE — 97802 MEDICAL NUTRITION INDIV IN: CPT

## 2020-09-17 ENCOUNTER — APPOINTMENT (OUTPATIENT)
Dept: ENDOCRINOLOGY | Facility: CLINIC | Age: 75
End: 2020-09-17
Payer: MEDICARE

## 2020-09-17 PROCEDURE — 97803 MED NUTRITION INDIV SUBSEQ: CPT

## 2020-10-01 ENCOUNTER — APPOINTMENT (OUTPATIENT)
Dept: CARDIOLOGY | Facility: CLINIC | Age: 75
End: 2020-10-01
Payer: MEDICARE

## 2020-10-01 ENCOUNTER — NON-APPOINTMENT (OUTPATIENT)
Age: 75
End: 2020-10-01

## 2020-10-01 VITALS
HEIGHT: 64.4 IN | SYSTOLIC BLOOD PRESSURE: 151 MMHG | BODY MASS INDEX: 28.16 KG/M2 | HEART RATE: 83 BPM | OXYGEN SATURATION: 98 % | DIASTOLIC BLOOD PRESSURE: 82 MMHG | WEIGHT: 167 LBS

## 2020-10-01 PROCEDURE — 99214 OFFICE O/P EST MOD 30 MIN: CPT

## 2020-10-01 PROCEDURE — 93000 ELECTROCARDIOGRAM COMPLETE: CPT

## 2020-10-01 NOTE — PHYSICAL EXAM
[General Appearance - Well Developed] : well developed [Normal Appearance] : normal appearance [Well Groomed] : well groomed [General Appearance - Well Nourished] : well nourished [No Deformities] : no deformities [General Appearance - In No Acute Distress] : no acute distress [Normal Conjunctiva] : the conjunctiva exhibited no abnormalities [Eyelids - No Xanthelasma] : the eyelids demonstrated no xanthelasmas [Normal Oral Mucosa] : normal oral mucosa [No Oral Pallor] : no oral pallor [No Oral Cyanosis] : no oral cyanosis [Normal Jugular Venous A Waves Present] : normal jugular venous A waves present [Normal Jugular Venous V Waves Present] : normal jugular venous V waves present [No Jugular Venous Maxwell A Waves] : no jugular venous maxwell A waves [Respiration, Rhythm And Depth] : normal respiratory rhythm and effort [Exaggerated Use Of Accessory Muscles For Inspiration] : no accessory muscle use [Auscultation Breath Sounds / Voice Sounds] : lungs were clear to auscultation bilaterally [Abdomen Soft] : soft [Abdomen Tenderness] : non-tender [Abdomen Mass (___ Cm)] : no abdominal mass palpated [Abnormal Walk] : normal gait [Gait - Sufficient For Exercise Testing] : the gait was sufficient for exercise testing [Nail Clubbing] : no clubbing of the fingernails [Cyanosis, Localized] : no localized cyanosis [Petechial Hemorrhages (___cm)] : no petechial hemorrhages [Skin Color & Pigmentation] : normal skin color and pigmentation [] : no rash [No Venous Stasis] : no venous stasis [Skin Lesions] : no skin lesions [No Skin Ulcers] : no skin ulcer [No Xanthoma] : no  xanthoma was observed [Oriented To Time, Place, And Person] : oriented to person, place, and time [Affect] : the affect was normal [Mood] : the mood was normal [No Anxiety] : not feeling anxious [Normal Rate] : normal [Rhythm Regular] : regular [Normal S1] : normal S1 [Normal S2] : normal S2 [S3] : no S3 [S4] : no S4 [No Murmur] : no murmurs heard [Right Carotid Bruit] : no bruit heard over the right carotid [Left Carotid Bruit] : no bruit heard over the left carotid [Right Femoral Bruit] : no bruit heard over the right femoral artery [Left Femoral Bruit] : no bruit heard over the left femoral artery [2+] : left 2+ [No Pitting Edema] : no pitting edema present

## 2020-10-01 NOTE — HISTORY OF PRESENT ILLNESS
[FreeTextEntry1] : Ambreen presented to the office today for a cardiovascular evaluation. I saw her 6 weeks ago.\par \par She is now 75 years old, with a history of anxiety and depression. She has been taking nortriptyline and Klonopin. She has not seen doctors frequently. She presented to the hospital June 5 with chest discomfort and elevated troponin. She was transferred for cardiac catheterization, was found to have an occluded diagonal branch. She had PCI. Her ejection fraction post MI was normal. She was discharged.\par \par She has been feeling well. She does not report symptoms of angina, heart failure or arrhythmia. She has been anxious. She has been checking her blood pressure at home, and it has been elevated, usually in the 150s.  She was previously having issues with fatigue, but this has resolved.  She has mild dyspnea only with stairs.

## 2020-10-01 NOTE — DISCUSSION/SUMMARY
[FreeTextEntry1] : She is feeling well following her recent presentation with an acute coronary syndrome. Her blood pressure is elevated. It sounds like she has only been taking 75 mg losartan, instead of 100 mg, as I have requested. She will increase her dose up to 100. She will also start amlodipine 5 mg daily. She will continue to check her blood pressure over the next month, and see me for a blood pressure check at that time.

## 2020-11-06 ENCOUNTER — APPOINTMENT (OUTPATIENT)
Dept: CARDIOLOGY | Facility: CLINIC | Age: 75
End: 2020-11-06
Payer: MEDICARE

## 2020-11-06 VITALS
WEIGHT: 166 LBS | HEART RATE: 101 BPM | SYSTOLIC BLOOD PRESSURE: 157 MMHG | DIASTOLIC BLOOD PRESSURE: 86 MMHG | OXYGEN SATURATION: 96 % | BODY MASS INDEX: 28.34 KG/M2 | HEIGHT: 64 IN

## 2020-11-06 PROCEDURE — 93000 ELECTROCARDIOGRAM COMPLETE: CPT

## 2020-11-06 PROCEDURE — 99214 OFFICE O/P EST MOD 30 MIN: CPT

## 2020-11-06 NOTE — HISTORY OF PRESENT ILLNESS
[FreeTextEntry1] : Ambreen presented to the office today for a cardiovascular evaluation. I saw her 1 month ago.\par \par She is now 75 years old, with a history of anxiety and depression. She has been taking nortriptyline and Klonopin. She has not seen doctors frequently. She presented to the hospital June 5 with chest discomfort and elevated troponin. She was transferred for cardiac catheterization, was found to have an occluded diagonal branch. She had PCI. Her ejection fraction post MI was normal. She was discharged.\par \par She has been feeling well. She does not report symptoms of angina, heart failure or arrhythmia. She has been anxious. She has been checking her blood pressure at home, and it has been elevated, usually in the 140s-150s.

## 2020-11-06 NOTE — DISCUSSION/SUMMARY
[FreeTextEntry1] : She is feeling well following her recent presentation with an acute coronary syndrome. Her blood pressure is elevated.\par \par She will add chlorthalidone 25.  She will come back in 1 month for another bp check and blood.

## 2020-11-11 ENCOUNTER — NON-APPOINTMENT (OUTPATIENT)
Age: 75
End: 2020-11-11

## 2020-11-11 ENCOUNTER — EMERGENCY (EMERGENCY)
Facility: HOSPITAL | Age: 75
LOS: 1 days | Discharge: HOME | End: 2020-11-11
Attending: EMERGENCY MEDICINE | Admitting: EMERGENCY MEDICINE
Payer: MEDICARE

## 2020-11-11 VITALS
TEMPERATURE: 98 F | RESPIRATION RATE: 19 BRPM | WEIGHT: 166.89 LBS | DIASTOLIC BLOOD PRESSURE: 70 MMHG | HEIGHT: 65 IN | SYSTOLIC BLOOD PRESSURE: 110 MMHG | OXYGEN SATURATION: 97 % | HEART RATE: 94 BPM

## 2020-11-11 VITALS
RESPIRATION RATE: 16 BRPM | HEART RATE: 91 BPM | SYSTOLIC BLOOD PRESSURE: 128 MMHG | OXYGEN SATURATION: 98 % | TEMPERATURE: 98 F | DIASTOLIC BLOOD PRESSURE: 60 MMHG

## 2020-11-11 LAB
ALBUMIN SERPL ELPH-MCNC: 3.3 G/DL — SIGNIFICANT CHANGE UP (ref 3.3–5)
ALP SERPL-CCNC: 78 U/L — SIGNIFICANT CHANGE UP (ref 40–120)
ALT FLD-CCNC: 19 U/L — SIGNIFICANT CHANGE UP (ref 12–78)
ANION GAP SERPL CALC-SCNC: 7 MMOL/L — SIGNIFICANT CHANGE UP (ref 5–17)
APPEARANCE UR: CLEAR — SIGNIFICANT CHANGE UP
AST SERPL-CCNC: 12 U/L — LOW (ref 15–37)
BASOPHILS # BLD AUTO: 0.06 K/UL — SIGNIFICANT CHANGE UP (ref 0–0.2)
BASOPHILS NFR BLD AUTO: 0.4 % — SIGNIFICANT CHANGE UP (ref 0–2)
BILIRUB SERPL-MCNC: 0.4 MG/DL — SIGNIFICANT CHANGE UP (ref 0.2–1.2)
BILIRUB UR-MCNC: NEGATIVE — SIGNIFICANT CHANGE UP
BUN SERPL-MCNC: 27 MG/DL — HIGH (ref 7–23)
CALCIUM SERPL-MCNC: 9.3 MG/DL — SIGNIFICANT CHANGE UP (ref 8.5–10.1)
CHLORIDE SERPL-SCNC: 107 MMOL/L — SIGNIFICANT CHANGE UP (ref 96–108)
CO2 SERPL-SCNC: 24 MMOL/L — SIGNIFICANT CHANGE UP (ref 22–31)
COLOR SPEC: SIGNIFICANT CHANGE UP
CREAT SERPL-MCNC: 1 MG/DL — SIGNIFICANT CHANGE UP (ref 0.5–1.3)
DIFF PNL FLD: NEGATIVE — SIGNIFICANT CHANGE UP
EOSINOPHIL # BLD AUTO: 0.18 K/UL — SIGNIFICANT CHANGE UP (ref 0–0.5)
EOSINOPHIL NFR BLD AUTO: 1.3 % — SIGNIFICANT CHANGE UP (ref 0–6)
GLUCOSE SERPL-MCNC: 167 MG/DL — HIGH (ref 70–99)
GLUCOSE UR QL: NEGATIVE — SIGNIFICANT CHANGE UP
HCT VFR BLD CALC: 40.5 % — SIGNIFICANT CHANGE UP (ref 34.5–45)
HGB BLD-MCNC: 14.1 G/DL — SIGNIFICANT CHANGE UP (ref 11.5–15.5)
IMM GRANULOCYTES NFR BLD AUTO: 0.4 % — SIGNIFICANT CHANGE UP (ref 0–1.5)
KETONES UR-MCNC: NEGATIVE — SIGNIFICANT CHANGE UP
LACTATE SERPL-SCNC: 1.9 MMOL/L — SIGNIFICANT CHANGE UP (ref 0.7–2)
LEUKOCYTE ESTERASE UR-ACNC: NEGATIVE — SIGNIFICANT CHANGE UP
LYMPHOCYTES # BLD AUTO: 15.3 % — SIGNIFICANT CHANGE UP (ref 13–44)
LYMPHOCYTES # BLD AUTO: 2.05 K/UL — SIGNIFICANT CHANGE UP (ref 1–3.3)
MCHC RBC-ENTMCNC: 29.3 PG — SIGNIFICANT CHANGE UP (ref 27–34)
MCHC RBC-ENTMCNC: 34.8 GM/DL — SIGNIFICANT CHANGE UP (ref 32–36)
MCV RBC AUTO: 84.2 FL — SIGNIFICANT CHANGE UP (ref 80–100)
MONOCYTES # BLD AUTO: 0.66 K/UL — SIGNIFICANT CHANGE UP (ref 0–0.9)
MONOCYTES NFR BLD AUTO: 4.9 % — SIGNIFICANT CHANGE UP (ref 2–14)
NEUTROPHILS # BLD AUTO: 10.37 K/UL — HIGH (ref 1.8–7.4)
NEUTROPHILS NFR BLD AUTO: 77.7 % — HIGH (ref 43–77)
NITRITE UR-MCNC: NEGATIVE — SIGNIFICANT CHANGE UP
NRBC # BLD: 0 /100 WBCS — SIGNIFICANT CHANGE UP (ref 0–0)
PH UR: 5 — SIGNIFICANT CHANGE UP (ref 5–8)
PLATELET # BLD AUTO: 310 K/UL — SIGNIFICANT CHANGE UP (ref 150–400)
POTASSIUM SERPL-MCNC: 4 MMOL/L — SIGNIFICANT CHANGE UP (ref 3.5–5.3)
POTASSIUM SERPL-SCNC: 4 MMOL/L — SIGNIFICANT CHANGE UP (ref 3.5–5.3)
PROT SERPL-MCNC: 7.6 G/DL — SIGNIFICANT CHANGE UP (ref 6–8.3)
PROT UR-MCNC: NEGATIVE — SIGNIFICANT CHANGE UP
RBC # BLD: 4.81 M/UL — SIGNIFICANT CHANGE UP (ref 3.8–5.2)
RBC # FLD: 12.7 % — SIGNIFICANT CHANGE UP (ref 10.3–14.5)
SARS-COV-2 RNA SPEC QL NAA+PROBE: SIGNIFICANT CHANGE UP
SODIUM SERPL-SCNC: 138 MMOL/L — SIGNIFICANT CHANGE UP (ref 135–145)
SP GR SPEC: 1.01 — SIGNIFICANT CHANGE UP (ref 1.01–1.02)
TROPONIN I SERPL-MCNC: <.015 NG/ML — SIGNIFICANT CHANGE UP (ref 0.01–0.04)
UROBILINOGEN FLD QL: NEGATIVE — SIGNIFICANT CHANGE UP
WBC # BLD: 13.37 K/UL — HIGH (ref 3.8–10.5)
WBC # FLD AUTO: 13.37 K/UL — HIGH (ref 3.8–10.5)

## 2020-11-11 PROCEDURE — 70450 CT HEAD/BRAIN W/O DYE: CPT

## 2020-11-11 PROCEDURE — 71045 X-RAY EXAM CHEST 1 VIEW: CPT | Mod: 26

## 2020-11-11 PROCEDURE — 71045 X-RAY EXAM CHEST 1 VIEW: CPT

## 2020-11-11 PROCEDURE — 84484 ASSAY OF TROPONIN QUANT: CPT

## 2020-11-11 PROCEDURE — 70450 CT HEAD/BRAIN W/O DYE: CPT | Mod: 26

## 2020-11-11 PROCEDURE — 96361 HYDRATE IV INFUSION ADD-ON: CPT

## 2020-11-11 PROCEDURE — 36415 COLL VENOUS BLD VENIPUNCTURE: CPT

## 2020-11-11 PROCEDURE — 80053 COMPREHEN METABOLIC PANEL: CPT

## 2020-11-11 PROCEDURE — 99284 EMERGENCY DEPT VISIT MOD MDM: CPT | Mod: 25

## 2020-11-11 PROCEDURE — 99285 EMERGENCY DEPT VISIT HI MDM: CPT

## 2020-11-11 PROCEDURE — 96374 THER/PROPH/DIAG INJ IV PUSH: CPT

## 2020-11-11 PROCEDURE — 72125 CT NECK SPINE W/O DYE: CPT | Mod: 26

## 2020-11-11 PROCEDURE — 82962 GLUCOSE BLOOD TEST: CPT

## 2020-11-11 PROCEDURE — 85025 COMPLETE CBC W/AUTO DIFF WBC: CPT

## 2020-11-11 PROCEDURE — 83605 ASSAY OF LACTIC ACID: CPT

## 2020-11-11 PROCEDURE — 72125 CT NECK SPINE W/O DYE: CPT

## 2020-11-11 PROCEDURE — U0003: CPT

## 2020-11-11 PROCEDURE — 81003 URINALYSIS AUTO W/O SCOPE: CPT

## 2020-11-11 PROCEDURE — 87086 URINE CULTURE/COLONY COUNT: CPT

## 2020-11-11 PROCEDURE — 87040 BLOOD CULTURE FOR BACTERIA: CPT

## 2020-11-11 PROCEDURE — 93005 ELECTROCARDIOGRAM TRACING: CPT

## 2020-11-11 PROCEDURE — 93010 ELECTROCARDIOGRAM REPORT: CPT

## 2020-11-11 PROCEDURE — 99284 EMERGENCY DEPT VISIT MOD MDM: CPT

## 2020-11-11 RX ORDER — SODIUM CHLORIDE 9 MG/ML
1000 INJECTION INTRAMUSCULAR; INTRAVENOUS; SUBCUTANEOUS ONCE
Refills: 0 | Status: COMPLETED | OUTPATIENT
Start: 2020-11-11 | End: 2020-11-11

## 2020-11-11 RX ORDER — ONDANSETRON 8 MG/1
4 TABLET, FILM COATED ORAL ONCE
Refills: 0 | Status: COMPLETED | OUTPATIENT
Start: 2020-11-11 | End: 2020-11-11

## 2020-11-11 RX ADMIN — SODIUM CHLORIDE 1000 MILLILITER(S): 9 INJECTION INTRAMUSCULAR; INTRAVENOUS; SUBCUTANEOUS at 12:50

## 2020-11-11 RX ADMIN — SODIUM CHLORIDE 1000 MILLILITER(S): 9 INJECTION INTRAMUSCULAR; INTRAVENOUS; SUBCUTANEOUS at 13:50

## 2020-11-11 RX ADMIN — ONDANSETRON 4 MILLIGRAM(S): 8 TABLET, FILM COATED ORAL at 13:15

## 2020-11-11 NOTE — ED PROVIDER NOTE - ATTENDING CONTRIBUTION TO CARE
74 yo F pw was sitting today - became dizzy and fell to ground after passing out. LOC x ~ 30 sec. No known cp/sob. no palp.Pt on abx for L sided tooth infxn - now improving. Some dec po intake. No neck / back pain. no abd pain. no tolentino / easy recent fatigue. no numb/ting/focal weak. no agg/allev factors. No other inj or co. no known covid exposure / prior infxn.  Exam: MM Moist. neck supple. no meningeal signs. abd soft NT. no ext signs of sig head trauma. No spinal tend. No other acute findings.   Check labs, xr, IVF, cardio

## 2020-11-11 NOTE — ED ADULT NURSE NOTE - CHPI ED NUR SYMPTOMS NEG
no back pain/no chest pain/no chills/no congestion/no diaphoresis/no dizziness/no fever/no shortness of breath/no syncope/no vomiting

## 2020-11-11 NOTE — ED PROVIDER NOTE - NSFOLLOWUPINSTRUCTIONS_ED_ALL_ED_FT
follow up with Dr Frazier  call tomorrow to arrange follow up  stop chlorthalidone as per Dr Lewis, cardio  hydrate, eat small frequent meals  any concerns, worsening symptoms return to ED

## 2020-11-11 NOTE — ED PROVIDER NOTE - OBJECTIVE STATEMENT
75 y female presents with syncopal episode this am, as per  he heard the patient moan from another room in the house, patient was in the kitchen sitting in a chair, he saw her leaning forward, and fall to the floor, hit her head on kitchen floor, states "she was out for like 30 seconds", patient states she was not feeling well yesterday, "I was tired", states she is on abx for a tooth infection,  started amoxicillin 2 days ago, takes the medication qid.  states she has not been eating and drinking much for a few days, denies chest pain, sob, vomiting, diarrhea,  states she felt nausea this morning, last night she had fever 100 degrees.  former smoker, denies etoh.  states she spoke with Dr Frazier today, was advised come to ED for evaluation.    PMH:  htn, dm, stent placed last year, hx of MI, anxiety, depression

## 2020-11-11 NOTE — ED ADULT NURSE REASSESSMENT NOTE - NS ED NURSE REASSESS COMMENT FT1
Plan for discharge with follow up outpatient cardiology. Patient aware and agrees with plan. Patient ambulatory to the restroom independently, steady gait and denies dizziness.

## 2020-11-11 NOTE — ED ADULT NURSE NOTE - NSIMPLEMENTINTERV_GEN_ALL_ED
Implemented All Fall Risk Interventions:  [place] to call system. Call bell, personal items and telephone within reach. Instruct patient to call for assistance. Room bathroom lighting operational. Non-slip footwear when patient is off stretcher. Physically safe environment: no spills, clutter or unnecessary equipment. Stretcher in lowest position, wheels locked, appropriate side rails in place. Provide visual cue, wrist band, yellow gown, etc. Monitor gait and stability. Monitor for mental status changes and reorient to person, place, and time. Review medications for side effects contributing to fall risk. Reinforce activity limits and safety measures with patient and family.

## 2020-11-11 NOTE — ED PROVIDER NOTE - CARE PROVIDER_API CALL
Forrest Frazier)  Cardiovascular Disease  [address] [place] [address]  Phone: [phone]  Fax: [phone]  Follow Up Time: 1-3 Days

## 2020-11-11 NOTE — CONSULT NOTE ADULT - ATTENDING COMMENTS
I saw and examined the patient personally. Spoke with above provider regarding this case. I reviewed the above findings completely.  I agree with the above history, physical, and plan which I have edited where appropriate.     She had a syncopal episode likely that was likely vagal in nature in setting of infection, poor po intake and starting a diuretic. Will dc diuretic. cont other meds. encourage po intake. EKG unchanged Will f/u with Dr. Frazier within 2 weeks.

## 2020-11-11 NOTE — ED ADULT NURSE NOTE - OBJECTIVE STATEMENT
75 year old female presents to the ED s/p syncope and collapse. Patient was at home this morning and had a syncopal episode. Patient was standing, syncopized, then fell to the floor. Patient admits to hitting her head, hematoma noted to the left forehead. Patient denies headache, dizziness, or blurred vision. Patient denies chest pain, shortness of breath, or palpitations. Patient admits to daily plavix after MI in June 2020. Patient also reports recent dental infection, started on antibiotics on Monday by dentist. Patient had an appointment to see an oral surgeon today. Redness noted to the left upper gums but no bleeding or discharge noted. Patient has history of UTI in June as well and  reports that symptoms today are similar to when she was diagnosed with the UTI. Patient also reports low grade fever last night. Patient afebrile in ED. 75 year old female presents to the ED s/p syncope and collapse. Patient was at home this morning and had a syncopal episode. Patient was standing, syncopized, then fell to the floor. Patient admits to hitting her head, hematoma noted to the left forehead. Patient denies headache, dizziness, or blurred vision. Patient denies chest pain, shortness of breath, or palpitations. Patient admits to daily plavix after MI in June 2020. Patient also reports recent dental infection, started on antibiotics on Monday by dentist. Patient had an appointment to see an oral surgeon today. Redness noted to the left upper gums but no bleeding or discharge noted. Patient has history of UTI in June as well and  reports that symptoms today are similar to when she was diagnosed with the UTI. Patient also reports low grade fever last night. Patient afebrile in ED. Patient also complains of mild nausea without vomiting.

## 2020-11-11 NOTE — CONSULT NOTE ADULT - ASSESSMENT
76 y/o  female with PMH of CAD, NSTEMI June 2020, S/p PCI to diag, anxiety, depression presented with c/o syncope.     Syncope  - Patient had tooth infection starting Monday and is currently on penicillin for that. Patient reports temperature up to 100F last night. Patient woke up this morning, was checking her blood sugars, felt diaphoretic, lightheaded and passed out, hit her head and sustained a bump on her left forehead.   - Vital signs stable in ED. Orthostatics negative.   - Labs un remarkable.   - EKG showed SR @ 94, RBBB, similar to old EKG   - CT head showed   - Patient received NS 1L IV x 1  - Asymptomatic at present.   - Stated she had similar syncopal event when she had UTI in past.   - Continue aspirin, Plavix, Metoprolol, Losartan and Lipitor for CAD    - All other medical needs as per primary team.  - Other cardiovascular workup will depend on clinical course.    Marco Mancilla, MS FNP, [facility]  Nurse Practitioner- Cardiology   Spectra #[phone]    74 y/o  female with PMH of CAD, NSTEMI June 2020, S/p PCI to diag, anxiety, depression presented with c/o syncope.     Syncope  - Patient had tooth infection starting Monday and is currently on penicillin for that. Patient reports temperature up to 100F last night. Patient woke up this morning, was checking her blood sugars, felt diaphoretic, lightheaded and passed out, hit her head and sustained a bump on her left forehead.   - Vital signs stable in ED. Orthostatics negative.   - Labs un remarkable. Troponin negative. WBC 13.37  - EKG showed SR @ 94, RBBB, similar to old EKG   - CT head showed no acute intracranial hemorrhage or mass effect.  - Patient received NS 1L IV x 1  - Asymptomatic at present.   - Patient was recently started on chlorthalidone for BP control and patient took dose yesterday and today.   - Would d/c chlorthalidone for now  - Stated she had similar syncopal event when she had UTI in past.   - Syncope could likely be 2/2 vasovagal episode in the setting of dehydration and infection  - Continue aspirin, Plavix, Metoprolol, Losartan and Lipitor for CAD  - Continue amlodipine 5   - Encourage hydration    - Patient can be discharged home on cardiac stand point. She can follow up with Dr Frazier for further cardiac care.   - Will follow if patient gets admitted for infection work up     MS MARCUS TempleP, [facility]  Nurse Practitioner- Cardiology   Spectra #[phone]    76 y/o  female with PMH of CAD, NSTEMI June 2020, S/p PCI to diag, anxiety, depression presented with c/o syncope.     Syncope  - Patient had tooth infection starting Monday and is currently on penicillin for that. Patient reports temperature up to 100F last night. Patient woke up this morning, was checking her blood sugars, felt diaphoretic, lightheaded and passed out, hit her head and sustained a bump on her left forehead.   - Vital signs stable in ED. Orthostatics negative.   - Labs un remarkable. Troponin negative. WBC 13.37  - EKG showed SR @ 94, RBBB, similar to old EKG   - CT head showed no acute intracranial hemorrhage or mass effect.  - Patient received NS 1L IV x 1  - Asymptomatic at present.   - Patient was recently started on chlorthalidone for BP control and patient took dose yesterday and today.   - Would d/c chlorthalidone for now  - Stated she had similar syncopal event when she had UTI in past.   - Syncope could likely be 2/2 vasovagal episode in the setting of dehydration and infection  - Continue aspirin, Plavix, Metoprolol, Losartan and Lipitor for CAD.    - Continue amlodipine 5   - Encourage hydration    - Patient can be discharged home on cardiac stand point. She can follow up with Dr Frazier for further cardiac care.   - Will follow if patient gets admitted for infection work up     MS MARCUS TempleP, [facility]  Nurse Practitioner- Cardiology   Spectra #[phone]

## 2020-11-11 NOTE — ED PROVIDER NOTE - CHPI ED SYMPTOMS NEG
no back pain/no chest pain/no cough/no dizziness/no shortness of breath/no vomiting/no chills/no diaphoresis

## 2020-11-11 NOTE — CONSULT NOTE ADULT - SUBJECTIVE AND OBJECTIVE BOX
[facility] - Joni Ace, Karin Dunaway, Yuri, Joshua, Gabe Tjeada  Office Number: [phone]    Initial Consult Note  CHIEF COMPLAINT: Patient is a 75y old  Female who presents with a chief complaint of   HPI: 74 y/o  female with PMH of anxiety, depression presented to     Allergies    No Known Allergies    Intolerances      PAST MEDICAL & SURGICAL HISTORY:  Depression    Anxiety    No significant past surgical history      MEDICATIONS  (STANDING):  ondansetron Injectable 4 milliGRAM(s) IV Push once  sodium chloride 0.9% Bolus 1000 milliLiter(s) IV Bolus once    MEDICATIONS  (PRN):    FAMILY HISTORY:  No pertinent family history in first degree relatives    SOCIAL HISTORY  Marital Status:   Occupation:   Lives with:     SUBSTANCE USE  Tobacco Usage:  ( ) None ( ) never smoked   ( ) former smoker  ( ) current smoker; Packs per day:   Alcohol Usage: ( ) none  ( ) occasional ( ) 2-3 times a week ( ) daily; Last drink:   Recreational drugs ( ) None    REVIEW OF SYSTEMS   CONSTITUTIONAL: No fevers, No chills, No fatigue, No weight gain  EYES: No vision changes, No vertigo, No throat pain   ENT: No congestion, No ear pain, No sore throat.  NECK: No pain, No stiffness  RESPIRATORY: No shortness of breath, No cough, No wheezing, No hemoptysis  CARDIOVASCULAR: No chest pain. No palpitations, No CARTER, No orthopnea, No PND, No pleuritic pain  GASTROINTESTINAL: No abdominal pain, No nausea, No vomiting, No hematemesis, No diarrhea No constipation. No melena  GENITOURINARY: No dysuria, No frequency, No incontinence, No hematuria  NEUROLOGICAL: No dizziness, No lightheadedness, No syncope, No LOC, No headache, No numbness, No weakness  MUSCULOSKELETAL: No joint pain, No joint swelling.  PSYCHIATRIC: No anxiety, No depression  DERMATOLOGY: No diaphoresis. No itching, No rashes, No pressure ulcers  HEME/LYMPH: No easy bruising, or bleeding gums  All other review of systems is negative unless indicated above.    VITAL SIGNS:   Vital Signs Last 24 Hrs  T(C): 36.6 (11 Nov 2020 12:15), Max: 36.6 (11 Nov 2020 12:15)  T(F): 97.8 (11 Nov 2020 12:15), Max: 97.8 (11 Nov 2020 12:15)  HR: 94 (11 Nov 2020 12:15) (94 - 94)  BP: 110/70 (11 Nov 2020 12:15) (110/70 - 110/70)  BP(mean): --  RR: 19 (11 Nov 2020 12:15) (19 - 19)  SpO2: 97% (11 Nov 2020 12:15) (97% - 97%)    Physical Exam:  Appearance: NAD, no distress, alert,   HEENT: Moist Mucous Membranes, Anicteric  Cardiovascular: Regular rate and rhythm, Normal S1 S2, No JVD, No murmurs, No rubs, gallops or clicks  Respiratory: Lungs clear to auscultation. No rales, No rhonchi, No wheezing. No tenderness to palpation  Gastrointestinal:  Soft, Non-tender, + BS  Neurologic: Non-focal  Skin: Warm and dry, No rashes, No ecchymosis, No cyanosis, No ulcers   Musculoskeletal: No clubbing, No cyanosis  Vascular: Peripheral pulses palpable 2+ bilaterally  Psychiatry: Mood & affect appropriate  Lymph: No peripheral edema.                  [facility] - Joni Ace, Emelyn, Karin, Yuri, Joshua, Gabe Tejada  Office Number: [phone]    Initial Consult Note  CHIEF COMPLAINT: Patient is a 75y old  Female who presents with a chief complaint of   HPI: 76 y/o  female with PMH of CAD, NSTEMI June 2020, S/p PCI to diag, anxiety, depression presented with c/o syncope.     Allergies  No Known Allergies    PAST MEDICAL & SURGICAL HISTORY:  Depression  Anxiety  No significant past surgical history    MEDICATIONS  (STANDING):  ondansetron Injectable 4 milliGRAM(s) IV Push once  sodium chloride 0.9% Bolus 1000 milliLiter(s) IV Bolus once    MEDICATIONS  (PRN):    FAMILY HISTORY:  No pertinent family history in first degree relatives    SOCIAL HISTORY  Marital Status:   Occupation:   Lives with:     SUBSTANCE USE  Tobacco Usage:  ( ) None ( ) never smoked   ( ) former smoker  ( ) current smoker; Packs per day:   Alcohol Usage: ( ) none  ( ) occasional ( ) 2-3 times a week ( ) daily; Last drink:   Recreational drugs ( ) None    REVIEW OF SYSTEMS   CONSTITUTIONAL: No fevers, No chills, No fatigue, No weight gain  EYES: No vision changes, No vertigo, No throat pain   ENT: No congestion, No ear pain, No sore throat.  NECK: No pain, No stiffness  RESPIRATORY: No shortness of breath, No cough, No wheezing, No hemoptysis  CARDIOVASCULAR: No chest pain. No palpitations, No CARTER, No orthopnea, No PND, No pleuritic pain  GASTROINTESTINAL: No abdominal pain, No nausea, No vomiting, No hematemesis, No diarrhea No constipation. No melena  GENITOURINARY: No dysuria, No frequency, No incontinence, No hematuria  NEUROLOGICAL: No dizziness, No lightheadedness, No syncope, No LOC, No headache, No numbness, No weakness  MUSCULOSKELETAL: No joint pain, No joint swelling.  PSYCHIATRIC: No anxiety, No depression  DERMATOLOGY: No diaphoresis. No itching, No rashes, No pressure ulcers  HEME/LYMPH: No easy bruising, or bleeding gums  All other review of systems is negative unless indicated above.    VITAL SIGNS:   Vital Signs Last 24 Hrs  T(C): 36.6 (11 Nov 2020 12:15), Max: 36.6 (11 Nov 2020 12:15)  T(F): 97.8 (11 Nov 2020 12:15), Max: 97.8 (11 Nov 2020 12:15)  HR: 94 (11 Nov 2020 12:15) (94 - 94)  BP: 110/70 (11 Nov 2020 12:15) (110/70 - 110/70)  BP(mean): --  RR: 19 (11 Nov 2020 12:15) (19 - 19)  SpO2: 97% (11 Nov 2020 12:15) (97% - 97%)    Physical Exam:  Appearance: NAD, no distress, alert,   HEENT: Moist Mucous Membranes, Anicteric  Cardiovascular: Regular rate and rhythm, Normal S1 S2, No JVD, No murmurs, No rubs, gallops or clicks  Respiratory: Lungs clear to auscultation. No rales, No rhonchi, No wheezing. No tenderness to palpation  Gastrointestinal:  Soft, Non-tender, + BS  Neurologic: Non-focal  Skin: Warm and dry, No rashes, No ecchymosis, No cyanosis, No ulcers   Musculoskeletal: No clubbing, No cyanosis  Vascular: Peripheral pulses palpable 2+ bilaterally  Psychiatry: Mood & affect appropriate  Lymph: No peripheral edema.     < from: Transthoracic Echocardiogram (06.08.20 @ 13:16) >  Dimensions:    Normal Values:  LA:     3.1    2.0 - 4.0 cm  Ao:     3.3    2.0 - 3.8 cm  SEPTUM: 1.1    0.6 - 1.2 cm  PWT:    1.1    0.6 -1.1 cm  LVIDd:  3.7    3.0 - 5.6 cm  LVIDs:  2.3    1.8 - 4.0 cm  Derived variables:  LVMI: 71 g/m2  RWT: 0.59  Fractional short: 38 %  Doppler Peak Velocity (m/sec): AoV=1.2  ------------------------------------------------------------------------  Observations:  Mitral Valve: Normal mitral valve.  Aortic Valve/Aorta: Normal trileaflet aortic valve. Peak  transaortic valve gradient equals 6 mm Hg. Peak left  ventricular outflow tract gradient equals 4 mm Hg, LVOT  velocity time integral equals 19 cm.  Aortic Root: 3.3 cm.  Left Atrium: Mildly dilated left atrium.  LA volume index =  37 cc/m2.  Left Ventricle: Normal left ventricular systolic function.  No segmental wall motion abnormalities. Increased relative  wall thickness with normal left ventricular mass index,  consistent with concentric left ventricular remodeling.  Mild diastolic dysfunction (Stage I).  Right Heart: Normal right atrium. Normal right ventricular  size and function. Normal tricuspid valve. Normal pulmonic  valve. Mild pulmonic regurgitation.  Pericardium/Pleura: Normal pericardium with no pericardial  effusion.  Hemodynamic: Estimated right atrial pressure is 8 mm Hg.  ------------------------------------------------------------------------  Conclusions:  1. Increased relative wall thickness with normal left  ventricular mass index, consistent with concentric left  ventricular remodeling.  2. Normal left ventricular systolic function. No segmental  wall motion abnormalities.  3. Mild diastolic dysfunction (Stage I).  < end of copied text > [facility] - Joni Ace, Emelyn, Karin, Yuri, Joshua, Gabe Tejada  Office Number: [phone]    Initial Consult Note  CHIEF COMPLAINT: Patient is a 75y old  Female who presents with a chief complaint of syncope   HPI: 74 y/o  female with PMH of CAD, NSTEMI June 2020, S/p PCI to diag, anxiety, depression presented with c/o syncope. Patient had tooth infection starting Monday and is currently on penicillin for that. Patient reports temperature up to 100F last night. Patient woke up this morning, was checking her blood sugars, felt diaphoretic, lightheaded and passed out, hit her head and sustained a bump on her left forehead. Patient came to ED for evaluation.     In ED, T(F): 98 HR: 94  BP: 110/70 RR: 15 SpO2: 99%. Orthostatics negative. Labs un remarkable. EKG showed SR @ 94, RBBB, CT head showed .... Patient received NS 1L IV x 1.     Allergies  No Known Allergies    PAST MEDICAL & SURGICAL HISTORY:  Depression  Anxiety  No significant past surgical history    MEDICATIONS  (STANDING):  ondansetron Injectable 4 milliGRAM(s) IV Push once  sodium chloride 0.9% Bolus 1000 milliLiter(s) IV Bolus once    MEDICATIONS  (PRN):    FAMILY HISTORY:  No pertinent family history in first degree relatives    SOCIAL HISTORY  Marital Status:    Occupation: retired   Lives with:      SUBSTANCE USE  Tobacco Usage:  (x) None ( ) never smoked   ( ) former smoker  ( ) current smoker; Packs per day:   Alcohol Usage: (x ) none  ( ) occasional ( ) 2-3 times a week ( ) daily; Last drink:   Recreational drugs (x ) None    REVIEW OF SYSTEMS   CONSTITUTIONAL: No fevers, No chills, No fatigue, No weight gain  EYES: No vision changes, No vertigo, No throat pain   ENT: No congestion, No ear pain, No sore throat.  NECK: No pain, No stiffness  RESPIRATORY: No shortness of breath, No cough, No wheezing, No hemoptysis  CARDIOVASCULAR: No chest pain. No palpitations, No CARTER, No orthopnea, No PND, No pleuritic pain  GASTROINTESTINAL: No abdominal pain, No nausea, No vomiting, No hematemesis, No diarrhea No constipation. No melena  GENITOURINARY: No dysuria, No frequency, No incontinence, No hematuria  NEUROLOGICAL: No dizziness, + lightheadedness, No syncope, No LOC, No headache, No numbness, No weakness  MUSCULOSKELETAL: No joint pain, No joint swelling.  PSYCHIATRIC: No anxiety, No depression  DERMATOLOGY: + diaphoresis. No itching, No rashes, No pressure ulcers  HEME/LYMPH: No easy bruising, or bleeding gums  All other review of systems is negative unless indicated above.    VITAL SIGNS:   Vital Signs Last 24 Hrs  T(C): 36.6 (11 Nov 2020 12:15), Max: 36.6 (11 Nov 2020 12:15)  T(F): 97.8 (11 Nov 2020 12:15), Max: 97.8 (11 Nov 2020 12:15)  HR: 94 (11 Nov 2020 12:15) (94 - 94)  BP: 110/70 (11 Nov 2020 12:15) (110/70 - 110/70)  BP(mean): --  RR: 19 (11 Nov 2020 12:15) (19 - 19)  SpO2: 97% (11 Nov 2020 12:15) (97% - 97%)    Physical Exam:  Appearance: NAD, no distress, alert, Well developed   HEENT: Moist Mucous Membranes, Anicteric  Cardiovascular: Regular rate and rhythm, Normal S1 S2, No JVD, No murmurs, No rubs, gallops or clicks  Respiratory: Lungs clear to auscultation. No rales, No rhonchi, No wheezing. No tenderness to palpation  Gastrointestinal:  Soft, Non-tender, + BS  Neurologic: Non-focal  Skin: Lt forehead hematoma Warm and dry, No rashes, No ecchymosis, No cyanosis, No ulcers   Musculoskeletal: No clubbing, No cyanosis  Vascular: Peripheral pulses palpable 2+ bilaterally  Psychiatry: Mood & affect appropriate  Lymph: No peripheral edema.     < from: Transthoracic Echocardiogram (06.08.20 @ 13:16) >  Dimensions:    Normal Values:  LA:     3.1    2.0 - 4.0 cm  Ao:     3.3    2.0 - 3.8 cm  SEPTUM: 1.1    0.6 - 1.2 cm  PWT:    1.1    0.6 -1.1 cm  LVIDd:  3.7    3.0 - 5.6 cm  LVIDs:  2.3    1.8 - 4.0 cm  Derived variables:  LVMI: 71 g/m2  RWT: 0.59  Fractional short: 38 %  Doppler Peak Velocity (m/sec): AoV=1.2  ------------------------------------------------------------------------  Observations:  Mitral Valve: Normal mitral valve.  Aortic Valve/Aorta: Normal trileaflet aortic valve. Peak  transaortic valve gradient equals 6 mm Hg. Peak left  ventricular outflow tract gradient equals 4 mm Hg, LVOT  velocity time integral equals 19 cm.  Aortic Root: 3.3 cm.  Left Atrium: Mildly dilated left atrium.  LA volume index =  37 cc/m2.  Left Ventricle: Normal left ventricular systolic function.  No segmental wall motion abnormalities. Increased relative  wall thickness with normal left ventricular mass index,  consistent with concentric left ventricular remodeling.  Mild diastolic dysfunction (Stage I).  Right Heart: Normal right atrium. Normal right ventricular  size and function. Normal tricuspid valve. Normal pulmonic  valve. Mild pulmonic regurgitation.  Pericardium/Pleura: Normal pericardium with no pericardial  effusion.  Hemodynamic: Estimated right atrial pressure is 8 mm Hg.  ------------------------------------------------------------------------  Conclusions:  1. Increased relative wall thickness with normal left  ventricular mass index, consistent with concentric left  ventricular remodeling.  2. Normal left ventricular systolic function. No segmental  wall motion abnormalities.  3. Mild diastolic dysfunction (Stage I).  < end of copied text > [facility] - Joni Ace, Emelyn, Karin, Yuri, Joshua, Gabe Tejada  Office Number: [phone]    Initial Consult Note  CHIEF COMPLAINT: Patient is a 75y old  Female who presents with a chief complaint of syncope   HPI: 76 y/o  female with PMH of CAD, NSTEMI June 2020, S/p PCI to diag, anxiety, depression presented with c/o syncope. Patient had tooth infection starting Monday and is currently on penicillin for that. Pt states she has not been eating and drinking much for a few days. Patient reports temperature up to 100F last night. Patient woke up this morning, was checking her blood sugars, felt diaphoretic, lightheaded and passed out, hit her head and sustained a bump on her left forehead. Patient came to ED for evaluation.     In ED, T(F): 98 HR: 94  BP: 110/70 RR: 15 SpO2: 99%. Orthostatics negative. Labs un remarkable. EKG showed SR @ 94, RBBB, CT head and C spine unremarkable.  Patient received NS 1L IV x 1.     Allergies  No Known Allergies    PAST MEDICAL & SURGICAL HISTORY:  Depression  Anxiety  No significant past surgical history    MEDICATIONS  (STANDING):  ondansetron Injectable 4 milliGRAM(s) IV Push once  sodium chloride 0.9% Bolus 1000 milliLiter(s) IV Bolus once    MEDICATIONS  (PRN):    FAMILY HISTORY:  No pertinent family history in first degree relatives    SOCIAL HISTORY  Marital Status:    Occupation: retired   Lives with:      SUBSTANCE USE  Tobacco Usage:  (x) None ( ) never smoked   ( ) former smoker  ( ) current smoker; Packs per day:   Alcohol Usage: (x ) none  ( ) occasional ( ) 2-3 times a week ( ) daily; Last drink:   Recreational drugs (x ) None    REVIEW OF SYSTEMS   CONSTITUTIONAL: No fevers, No chills, No fatigue, No weight gain  EYES: No vision changes, No vertigo, No throat pain   ENT: No congestion, No ear pain, No sore throat.  NECK: No pain, No stiffness  RESPIRATORY: No shortness of breath, No cough, No wheezing, No hemoptysis  CARDIOVASCULAR: No chest pain. No palpitations, No CARTER, No orthopnea, No PND, No pleuritic pain  GASTROINTESTINAL: No abdominal pain, No nausea, No vomiting, No hematemesis, No diarrhea No constipation. No melena  GENITOURINARY: No dysuria, No frequency, No incontinence, No hematuria  NEUROLOGICAL: No dizziness, + lightheadedness, No syncope, No LOC, No headache, No numbness, No weakness  MUSCULOSKELETAL: No joint pain, No joint swelling.  PSYCHIATRIC: No anxiety, No depression  DERMATOLOGY: + diaphoresis. No itching, No rashes, No pressure ulcers  HEME/LYMPH: No easy bruising, or bleeding gums  All other review of systems is negative unless indicated above.    VITAL SIGNS:   Vital Signs Last 24 Hrs  T(C): 36.6 (11 Nov 2020 12:15), Max: 36.6 (11 Nov 2020 12:15)  T(F): 97.8 (11 Nov 2020 12:15), Max: 97.8 (11 Nov 2020 12:15)  HR: 94 (11 Nov 2020 12:15) (94 - 94)  BP: 110/70 (11 Nov 2020 12:15) (110/70 - 110/70)  BP(mean): --  RR: 19 (11 Nov 2020 12:15) (19 - 19)  SpO2: 97% (11 Nov 2020 12:15) (97% - 97%)    Physical Exam:  Appearance: NAD, no distress, alert, Well developed   HEENT: Moist Mucous Membranes, Anicteric  Cardiovascular: Regular rate and rhythm, Normal S1 S2, No JVD, No murmurs, No rubs, gallops or clicks  Respiratory: Lungs clear to auscultation. No rales, No rhonchi, No wheezing. No tenderness to palpation  Gastrointestinal:  Soft, Non-tender, + BS  Neurologic: Non-focal  Skin: Lt forehead hematoma Warm and dry, No rashes, No ecchymosis, No cyanosis, No ulcers   Musculoskeletal: No clubbing, No cyanosis  Vascular: Peripheral pulses palpable 2+ bilaterally  Psychiatry: Mood & affect appropriate  Lymph: No peripheral edema.     < from: Transthoracic Echocardiogram (06.08.20 @ 13:16) >  Dimensions:    Normal Values:  LA:     3.1    2.0 - 4.0 cm  Ao:     3.3    2.0 - 3.8 cm  SEPTUM: 1.1    0.6 - 1.2 cm  PWT:    1.1    0.6 -1.1 cm  LVIDd:  3.7    3.0 - 5.6 cm  LVIDs:  2.3    1.8 - 4.0 cm  Derived variables:  LVMI: 71 g/m2  RWT: 0.59  Fractional short: 38 %  Doppler Peak Velocity (m/sec): AoV=1.2  ------------------------------------------------------------------------  Observations:  Mitral Valve: Normal mitral valve.  Aortic Valve/Aorta: Normal trileaflet aortic valve. Peak  transaortic valve gradient equals 6 mm Hg. Peak left  ventricular outflow tract gradient equals 4 mm Hg, LVOT  velocity time integral equals 19 cm.  Aortic Root: 3.3 cm.  Left Atrium: Mildly dilated left atrium.  LA volume index =  37 cc/m2.  Left Ventricle: Normal left ventricular systolic function.  No segmental wall motion abnormalities. Increased relative  wall thickness with normal left ventricular mass index,  consistent with concentric left ventricular remodeling.  Mild diastolic dysfunction (Stage I).  Right Heart: Normal right atrium. Normal right ventricular  size and function. Normal tricuspid valve. Normal pulmonic  valve. Mild pulmonic regurgitation.  Pericardium/Pleura: Normal pericardium with no pericardial  effusion.  Hemodynamic: Estimated right atrial pressure is 8 mm Hg.  ------------------------------------------------------------------------  Conclusions:  1. Increased relative wall thickness with normal left  ventricular mass index, consistent with concentric left  ventricular remodeling.  2. Normal left ventricular systolic function. No segmental  wall motion abnormalities.  3. Mild diastolic dysfunction (Stage I).  < end of copied text >    < from: CT Cervical Spine No Cont (11.11.20 @ 13:13) >  IMPRESSION:  CT head: No acute intracranial hemorrhage or mass effect.  CT cervical spine: No evidence for acute displaced fracture or malalignment.  < end of copied text >

## 2020-11-11 NOTE — ED PROVIDER NOTE - PROGRESS NOTE DETAILS
spoke with Dr Lewis, cardio, case discussed, will see patient as per Dr Lewis, patient cleared from cardiac standpoint, advised stop chlorthalidone, follow up with Dr Frazier  patient resting comfortably, results discussed, discussed follow up with Dr Frazier  copy of results given,  if any concerns return to ED as per Dr Lewis, patient cleared from cardiac standpoint, advised stop chlorthalidone, follow up with Dr Frazier, informed covid test is pending, will call if positive, if negative result she will not receive a call   patient resting comfortably, results discussed, discussed follow up with Dr Frazier  copy of results given,  if any concerns return to ED

## 2020-11-13 LAB
CULTURE RESULTS: SIGNIFICANT CHANGE UP
SPECIMEN SOURCE: SIGNIFICANT CHANGE UP

## 2020-11-16 ENCOUNTER — APPOINTMENT (OUTPATIENT)
Dept: ENDOCRINOLOGY | Facility: CLINIC | Age: 75
End: 2020-11-16
Payer: MEDICARE

## 2020-11-16 VITALS
HEIGHT: 64 IN | BODY MASS INDEX: 28.34 KG/M2 | OXYGEN SATURATION: 99 % | RESPIRATION RATE: 14 BRPM | DIASTOLIC BLOOD PRESSURE: 78 MMHG | SYSTOLIC BLOOD PRESSURE: 133 MMHG | WEIGHT: 166 LBS | HEART RATE: 89 BPM

## 2020-11-16 LAB
CULTURE RESULTS: SIGNIFICANT CHANGE UP
CULTURE RESULTS: SIGNIFICANT CHANGE UP
SPECIMEN SOURCE: SIGNIFICANT CHANGE UP
SPECIMEN SOURCE: SIGNIFICANT CHANGE UP

## 2020-11-16 PROCEDURE — 83036 HEMOGLOBIN GLYCOSYLATED A1C: CPT | Mod: QW

## 2020-11-16 PROCEDURE — 82962 GLUCOSE BLOOD TEST: CPT

## 2020-11-16 PROCEDURE — 99214 OFFICE O/P EST MOD 30 MIN: CPT | Mod: 25

## 2020-11-16 RX ORDER — PENICILLIN V POTASSIUM 250 MG/1
250 TABLET, FILM COATED ORAL
Refills: 0 | Status: ACTIVE | COMMUNITY

## 2020-11-16 RX ORDER — CHLORTHALIDONE 25 MG/1
25 TABLET ORAL DAILY
Qty: 90 | Refills: 3 | Status: DISCONTINUED | COMMUNITY
Start: 2020-11-06 | End: 2020-11-16

## 2020-11-16 NOTE — ASSESSMENT
[FreeTextEntry1] : 75 year old female with past medical history of Diabetes Mellitus Type 2, CAD s/p FRANCISCO J (6/2020)  who presents for management of her diabetes\par \par 1. DM 2- uncontrolled, uncomplicated\par Patient counseled on the importance of diabetic control and complications. Patient's diet and the necessary changes discussed. Reviewed over glycemic goals. Patient is currently hyperglycemic due to infection.\par \par Cont Metformin 500 mg QD\par Check fsg  BID\par Cont diabetic diet\par Cont exercise\par will check microalbumin\par Opthalmology Visit needs to follow up\par Foot Exam up to date\par \par 2. HLD- stable\par Well controlled on atorvastatin\par \par 3.HTN\par Cont Losartan and Metoprolol. Follow up with cardiology\par \par Follow up in 3 months\par \par \par

## 2020-11-16 NOTE — HISTORY OF PRESENT ILLNESS
[FreeTextEntry1] : Ms. DALIA GREENE  is a 75 year old female with past medical history of Diabetes Mellitus Type 2, CAD s/p FRANCISCO J (6/2020)  who presents for management of her diabetes. Patient has a recent dental infection and was placed on antibiotics. She went to ED and was found to have low BP. Her chlorthalidone was stopped. Patient lost a few lbs.  She has been hyperglycemic the last week.\par \par \par POCT Glucose: 191 mg/dL, patient had lunch 3 hours ago\par POCT Hga1c:7 %\par \par \par Diabetes first diagnosed:8/2020\par Type:2\par \par Current diabetic regimen:\par Metformin 500 mg QD\par Other relevant medications:\par \par Self monitoring blood glucose : Patient checks few times a day\par \par SMBG:\par Pre-breakfast: 145-155\par Pre-lunch:112\par Pre-dinner:\par bedtime:178-186\par \par Hypoglycemia:\par \par \par Diet:\par Breakfast: english muffin with jam\par Lunch: half a roll with hot dog, egg and ham sandwich\par Dinner: meat, veggies, baked potato\par Snacks: ice cream \par \par Exercise:\par \par Urine micro:NA\par lipid profile:LDL 64 (8/2020)\par last hba1c: 8.4% (8/2020)\par eye exam: none\par diabetic foot exam/podiatry: in office (9/2020)\par \par \par \par \par

## 2020-11-17 LAB
CREAT SPEC-SCNC: 44 MG/DL
MICROALBUMIN 24H UR DL<=1MG/L-MCNC: <1.2 MG/DL
MICROALBUMIN/CREAT 24H UR-RTO: NORMAL MG/G

## 2020-11-17 RX ORDER — ERYTHROMYCIN 20 MG/G
2 GEL TOPICAL
Refills: 0 | Status: ACTIVE | COMMUNITY
Start: 2020-11-17

## 2020-11-17 RX ORDER — CLONAZEPAM 0.5 MG/1
0.5 TABLET ORAL
Refills: 0 | Status: ACTIVE | COMMUNITY

## 2020-11-17 RX ORDER — CLONAZEPAM 0.5 MG/1
0.5 TABLET ORAL
Refills: 0 | Status: DISCONTINUED | COMMUNITY
End: 2020-11-17

## 2020-11-17 RX ORDER — CEPHALEXIN 500 MG/1
500 CAPSULE ORAL 3 TIMES DAILY
Refills: 0 | Status: ACTIVE | COMMUNITY
Start: 2020-11-17

## 2020-11-19 ENCOUNTER — NON-APPOINTMENT (OUTPATIENT)
Age: 75
End: 2020-11-19

## 2020-11-19 ENCOUNTER — APPOINTMENT (OUTPATIENT)
Dept: CARDIOLOGY | Facility: CLINIC | Age: 75
End: 2020-11-19
Payer: MEDICARE

## 2020-11-19 VITALS
DIASTOLIC BLOOD PRESSURE: 85 MMHG | SYSTOLIC BLOOD PRESSURE: 154 MMHG | HEIGHT: 64 IN | HEART RATE: 104 BPM | WEIGHT: 169 LBS | BODY MASS INDEX: 28.85 KG/M2 | OXYGEN SATURATION: 97 %

## 2020-11-19 PROCEDURE — 99214 OFFICE O/P EST MOD 30 MIN: CPT

## 2020-11-19 PROCEDURE — 93000 ELECTROCARDIOGRAM COMPLETE: CPT

## 2020-11-19 NOTE — PHYSICAL EXAM
[General Appearance - Well Developed] : well developed [Normal Appearance] : normal appearance [Well Groomed] : well groomed [General Appearance - Well Nourished] : well nourished [No Deformities] : no deformities [General Appearance - In No Acute Distress] : no acute distress [Normal Conjunctiva] : the conjunctiva exhibited no abnormalities [Eyelids - No Xanthelasma] : the eyelids demonstrated no xanthelasmas [Normal Oral Mucosa] : normal oral mucosa [No Oral Pallor] : no oral pallor [No Oral Cyanosis] : no oral cyanosis [Normal Jugular Venous A Waves Present] : normal jugular venous A waves present [Normal Jugular Venous V Waves Present] : normal jugular venous V waves present [No Jugular Venous Maxwell A Waves] : no jugular venous maxwell A waves [Respiration, Rhythm And Depth] : normal respiratory rhythm and effort [Exaggerated Use Of Accessory Muscles For Inspiration] : no accessory muscle use [Auscultation Breath Sounds / Voice Sounds] : lungs were clear to auscultation bilaterally [Abdomen Soft] : soft [Abdomen Tenderness] : non-tender [Abdomen Mass (___ Cm)] : no abdominal mass palpated [Abnormal Walk] : normal gait [Gait - Sufficient For Exercise Testing] : the gait was sufficient for exercise testing [Nail Clubbing] : no clubbing of the fingernails [Cyanosis, Localized] : no localized cyanosis [Petechial Hemorrhages (___cm)] : no petechial hemorrhages [Skin Color & Pigmentation] : normal skin color and pigmentation [] : no rash [No Venous Stasis] : no venous stasis [Skin Lesions] : no skin lesions [No Skin Ulcers] : no skin ulcer [No Xanthoma] : no  xanthoma was observed [Oriented To Time, Place, And Person] : oriented to person, place, and time [Affect] : the affect was normal [Mood] : the mood was normal [No Anxiety] : not feeling anxious [Normal Rate] : normal [Rhythm Regular] : regular [Normal S1] : normal S1 [Normal S2] : normal S2 [No Murmur] : no murmurs heard [2+] : left 2+ [No Pitting Edema] : no pitting edema present [S3] : no S3 [S4] : no S4 [Right Carotid Bruit] : no bruit heard over the right carotid [Left Carotid Bruit] : no bruit heard over the left carotid [Right Femoral Bruit] : no bruit heard over the right femoral artery [Left Femoral Bruit] : no bruit heard over the left femoral artery

## 2020-11-19 NOTE — HISTORY OF PRESENT ILLNESS
[FreeTextEntry1] : Kristan presented to the office today for a cardiovascular evaluation. I saw her 2 weeks ago.\par \par She is now 75 years old, with a history of anxiety and depression. She has been taking nortriptyline and Klonopin. She has not seen doctors frequently. She presented to the hospital June 5 with chest discomfort and elevated troponin. She was transferred for cardiac catheterization, was found to have an occluded diagonal branch. She had PCI. Her ejection fraction post MI was normal. She was discharged.\par \par I saw her in the office 2 weeks ago, and her blood pressure was elevated. I added chlorthalidone. Several days later, in the setting of a dental infection, she had an episode of syncope. Chlorthalidone was discontinued.\par \par She has been feeling well. She does not report symptoms of angina, heart failure or arrhythmia. She has been anxious. She has been checking her blood pressure at home, and it has been labile, sometimes in the 120s, sometimes in the 140s-150s.

## 2020-11-19 NOTE — DISCUSSION/SUMMARY
[FreeTextEntry1] : She is feeling well following her recent presentation with an acute coronary syndrome. Her blood pressure is elevated.\par \par I suspected that she became hypotensive because of chlorthalidone and a dental infection. Given that her blood pressure seems to be trending in a favorable direction, I will not resume chlorthalidone.\par \par I have suggested a followup in about 3 months. I would consider resuming a diuretic at her next visit, depending upon her clinical course. If she is actually quite sensitive to chlorthalidone, we might be able to reduce or eliminate something else.

## 2021-02-08 ENCOUNTER — APPOINTMENT (OUTPATIENT)
Dept: CARDIOLOGY | Facility: CLINIC | Age: 76
End: 2021-02-08
Payer: MEDICARE

## 2021-02-08 ENCOUNTER — NON-APPOINTMENT (OUTPATIENT)
Age: 76
End: 2021-02-08

## 2021-02-08 VITALS
HEART RATE: 70 BPM | DIASTOLIC BLOOD PRESSURE: 85 MMHG | BODY MASS INDEX: 28.34 KG/M2 | OXYGEN SATURATION: 99 % | SYSTOLIC BLOOD PRESSURE: 152 MMHG | WEIGHT: 166 LBS | HEIGHT: 64 IN

## 2021-02-08 PROCEDURE — 99214 OFFICE O/P EST MOD 30 MIN: CPT

## 2021-02-08 PROCEDURE — 93000 ELECTROCARDIOGRAM COMPLETE: CPT

## 2021-02-08 NOTE — HISTORY OF PRESENT ILLNESS
[FreeTextEntry1] : Kristan presented to the office today for a cardiovascular evaluation. I saw her 2 months ago.\par \par She is now 76 years old, with a history of anxiety and depression. She has been taking nortriptyline and Klonopin. She has not seen doctors frequently. She presented to the hospital June 5 with chest discomfort and elevated troponin. She was transferred for cardiac catheterization, was found to have an occluded diagonal branch. She had PCI. Her ejection fraction post MI was normal. She was discharged.\par \par I saw her in the office in November and her blood pressure was elevated. I added chlorthalidone. Several days later, in the setting of a dental infection, she had an episode of syncope. Chlorthalidone was discontinued.  I saw her in the office after that, and I did not make any changes at that time.\par \par She has been feeling well since the last visit.  Her blood pressure has remained above goal, commonly in the 130s over 70s.  She has not been lightheaded.  There have been no further episodes of syncope.  She remains otherwise well, without symptoms of angina or heart failure.

## 2021-02-08 NOTE — DISCUSSION/SUMMARY
[FreeTextEntry1] : She is feeling well following her recent presentation with an acute coronary syndrome. Her blood pressure is elevated.\par \par She is resistant to resuming a diuretic because of her syncopal episode.  Instead, I will raise amlodipine up to 10 mg daily.  She will call me in 2 weeks, and hopefully her blood pressure will be controlled.  If so, she can see me in 3 months.

## 2021-02-08 NOTE — PHYSICAL EXAM
[General Appearance - Well Developed] : well developed [Normal Appearance] : normal appearance [Well Groomed] : well groomed [General Appearance - Well Nourished] : well nourished [No Deformities] : no deformities [General Appearance - In No Acute Distress] : no acute distress [Normal Conjunctiva] : the conjunctiva exhibited no abnormalities [Eyelids - No Xanthelasma] : the eyelids demonstrated no xanthelasmas [Normal Oral Mucosa] : normal oral mucosa [No Oral Pallor] : no oral pallor [No Oral Cyanosis] : no oral cyanosis [Normal Jugular Venous A Waves Present] : normal jugular venous A waves present [Normal Jugular Venous V Waves Present] : normal jugular venous V waves present [No Jugular Venous Maxwell A Waves] : no jugular venous maxwell A waves [Respiration, Rhythm And Depth] : normal respiratory rhythm and effort [Exaggerated Use Of Accessory Muscles For Inspiration] : no accessory muscle use [Auscultation Breath Sounds / Voice Sounds] : lungs were clear to auscultation bilaterally [Abdomen Soft] : soft [Abdomen Tenderness] : non-tender [Abdomen Mass (___ Cm)] : no abdominal mass palpated [Abnormal Walk] : normal gait [Gait - Sufficient For Exercise Testing] : the gait was sufficient for exercise testing [Nail Clubbing] : no clubbing of the fingernails [Cyanosis, Localized] : no localized cyanosis [Petechial Hemorrhages (___cm)] : no petechial hemorrhages [Skin Color & Pigmentation] : normal skin color and pigmentation [] : no rash [No Venous Stasis] : no venous stasis [Skin Lesions] : no skin lesions [No Skin Ulcers] : no skin ulcer [No Xanthoma] : no  xanthoma was observed [Oriented To Time, Place, And Person] : oriented to person, place, and time [Mood] : the mood was normal [Affect] : the affect was normal [No Anxiety] : not feeling anxious [Normal Rate] : normal [Rhythm Regular] : regular [Normal S1] : normal S1 [Normal S2] : normal S2 [S3] : no S3 [S4] : no S4 [No Murmur] : no murmurs heard [Right Carotid Bruit] : no bruit heard over the right carotid [Left Carotid Bruit] : no bruit heard over the left carotid [Right Femoral Bruit] : no bruit heard over the right femoral artery [Left Femoral Bruit] : no bruit heard over the left femoral artery [2+] : left 2+ [No Pitting Edema] : no pitting edema present

## 2021-02-11 ENCOUNTER — APPOINTMENT (OUTPATIENT)
Dept: ENDOCRINOLOGY | Facility: CLINIC | Age: 76
End: 2021-02-11
Payer: MEDICARE

## 2021-02-11 ENCOUNTER — RESULT CHARGE (OUTPATIENT)
Age: 76
End: 2021-02-11

## 2021-02-11 VITALS
WEIGHT: 165 LBS | OXYGEN SATURATION: 98 % | BODY MASS INDEX: 28.17 KG/M2 | HEIGHT: 64 IN | HEART RATE: 73 BPM | RESPIRATION RATE: 14 BRPM

## 2021-02-11 PROCEDURE — 83036 HEMOGLOBIN GLYCOSYLATED A1C: CPT | Mod: QW

## 2021-02-11 PROCEDURE — 99214 OFFICE O/P EST MOD 30 MIN: CPT | Mod: 25

## 2021-02-11 PROCEDURE — 82962 GLUCOSE BLOOD TEST: CPT

## 2021-02-11 NOTE — HISTORY OF PRESENT ILLNESS
[FreeTextEntry1] : Ms. DALIA GREENE  is a 75 year old female with past medical history of Diabetes Mellitus Type 2, CAD s/p FRANCISCO J (6/2020)  who presents for management of her diabetes.\par \par \par POCT Glucose: 102 mg/dL\par POCT Hga1c:6.8 %\par \par \par Diabetes first diagnosed:8/2020\par Type:2\par \par Current diabetic regimen:\par Metformin 500 mg QD\par Other relevant medications:\par \par Self monitoring blood glucose : Patient checks few times a day\par \par SMBG:\par Pre-breakfast: 140s\par Pre-lunch:112\par Pre-dinner:\par bedtime:178-186\par \par Hypoglycemia:\par \par \par Diet:\par Breakfast: english muffin with jam\par Lunch: half a roll with hot dog, egg and ham sandwich\par Dinner: meat, veggies, baked potato\par Snacks: ice cream \par \par Exercise:\par \par Urine micro:wnl (11/16/20)\par lipid profile:LDL 64 (8/2020)\par last hba1c: 6.8% (2/11/21), 7% (11/2020),8.4% (8/2020)\par eye exam: none\par diabetic foot exam/podiatry: in office (9/2020)\par \par \par \par \par

## 2021-02-11 NOTE — ASSESSMENT
[FreeTextEntry1] : 75 year old female with past medical history of Diabetes Mellitus Type 2, CAD s/p FRANCISCO J (6/2020)  who presents for management of her diabetes\par \par 1. DM 2- controlled, uncomplicated\par Patient counseled on the importance of diabetic control and complications. Patient's diet and the necessary changes discussed. Reviewed over glycemic goals. \par \par Cont Metformin 500 mg QD\par Check fsg  BID\par Cont diabetic diet\par Cont exercise\par will check microalbumin\par Opthalmology Visit needs to follow up\par Foot Exam up to date\par \par 2. HLD- stable\par Well controlled on atorvastatin\par \par 3.HTN\par Cont Losartan and Metoprolol. Follow up with cardiology\par \par Follow up in 3 months\par \par \par

## 2021-05-10 ENCOUNTER — APPOINTMENT (OUTPATIENT)
Dept: CARDIOLOGY | Facility: CLINIC | Age: 76
End: 2021-05-10
Payer: MEDICARE

## 2021-05-10 ENCOUNTER — NON-APPOINTMENT (OUTPATIENT)
Age: 76
End: 2021-05-10

## 2021-05-10 VITALS
SYSTOLIC BLOOD PRESSURE: 123 MMHG | HEART RATE: 90 BPM | HEIGHT: 64 IN | DIASTOLIC BLOOD PRESSURE: 75 MMHG | WEIGHT: 167 LBS | BODY MASS INDEX: 28.51 KG/M2 | OXYGEN SATURATION: 100 %

## 2021-05-10 PROCEDURE — 93000 ELECTROCARDIOGRAM COMPLETE: CPT

## 2021-05-10 PROCEDURE — 99214 OFFICE O/P EST MOD 30 MIN: CPT

## 2021-05-10 NOTE — HISTORY OF PRESENT ILLNESS
[FreeTextEntry1] : Kristan presented to the office today for a cardiovascular evaluation. I saw her 6 months ago.\par \par She is now 76 years old, with a history of anxiety and depression. She has been taking nortriptyline and Klonopin. She has not seen doctors frequently. She presented to the hospital June 5 with chest discomfort and elevated troponin. She was transferred for cardiac catheterization, was found to have an occluded diagonal branch. She had PCI. Her ejection fraction post MI was normal. She was discharged.\par \par I saw her in the office in November and her blood pressure was elevated. I added chlorthalidone. Several days later, in the setting of a dental infection, she had an episode of syncope. Chlorthalidone was discontinued.  At the last visit, I increased amlodipine.\par \par She has been feeling well since the last visit.  Her blood pressure has remained controlled.  She has not been lightheaded.  There have been no further episodes of syncope.  She remains otherwise well, without symptoms of angina or heart failure.

## 2021-05-10 NOTE — DISCUSSION/SUMMARY
[FreeTextEntry1] : She is feeling well following her presentation with an acute coronary syndrome. Her blood pressure is controlled.\par \par She does not have any recurrent symptoms of angina, heart failure or arrhythmia.  She will continue to try to increase her physical activity.\par \par I will make no changes at this time.  Have suggested a follow-up in 6 months.  We can consider another echocardiogram, and possibly a stress test at that time.

## 2021-05-17 ENCOUNTER — APPOINTMENT (OUTPATIENT)
Dept: ENDOCRINOLOGY | Facility: CLINIC | Age: 76
End: 2021-05-17
Payer: MEDICARE

## 2021-05-17 VITALS
HEART RATE: 90 BPM | HEIGHT: 64 IN | SYSTOLIC BLOOD PRESSURE: 131 MMHG | OXYGEN SATURATION: 99 % | WEIGHT: 165 LBS | DIASTOLIC BLOOD PRESSURE: 83 MMHG | BODY MASS INDEX: 28.17 KG/M2

## 2021-05-17 LAB
ALBUMIN SERPL ELPH-MCNC: 4.1 G/DL
ALP BLD-CCNC: 82 U/L
ALT SERPL-CCNC: 12 U/L
ANION GAP SERPL CALC-SCNC: 10 MMOL/L
AST SERPL-CCNC: 16 U/L
BASOPHILS # BLD AUTO: 0.09 K/UL
BASOPHILS NFR BLD AUTO: 1 %
BILIRUB SERPL-MCNC: 0.4 MG/DL
BUN SERPL-MCNC: 22 MG/DL
CALCIUM SERPL-MCNC: 9.9 MG/DL
CHLORIDE SERPL-SCNC: 106 MMOL/L
CHOLEST SERPL-MCNC: 126 MG/DL
CO2 SERPL-SCNC: 23 MMOL/L
CREAT SERPL-MCNC: 0.8 MG/DL
CREAT SPEC-SCNC: 125 MG/DL
EOSINOPHIL # BLD AUTO: 0.31 K/UL
EOSINOPHIL NFR BLD AUTO: 3.6 %
ESTIMATED AVERAGE GLUCOSE: 166 MG/DL
GLUCOSE SERPL-MCNC: 162 MG/DL
HBA1C MFR BLD HPLC: 7.4 %
HCT VFR BLD CALC: 44.9 %
HDLC SERPL-MCNC: 46 MG/DL
HGB BLD-MCNC: 14.4 G/DL
IMM GRANULOCYTES NFR BLD AUTO: 0.2 %
LDLC SERPL CALC-MCNC: 70 MG/DL
LYMPHOCYTES # BLD AUTO: 2.38 K/UL
LYMPHOCYTES NFR BLD AUTO: 27.7 %
MAN DIFF?: NORMAL
MCHC RBC-ENTMCNC: 28.7 PG
MCHC RBC-ENTMCNC: 32.1 GM/DL
MCV RBC AUTO: 89.6 FL
MICROALBUMIN 24H UR DL<=1MG/L-MCNC: 3.8 MG/DL
MICROALBUMIN/CREAT 24H UR-RTO: 30 MG/G
MONOCYTES # BLD AUTO: 0.54 K/UL
MONOCYTES NFR BLD AUTO: 6.3 %
NEUTROPHILS # BLD AUTO: 5.24 K/UL
NEUTROPHILS NFR BLD AUTO: 61.2 %
NONHDLC SERPL-MCNC: 79 MG/DL
PLATELET # BLD AUTO: 349 K/UL
POTASSIUM SERPL-SCNC: 4.9 MMOL/L
PROT SERPL-MCNC: 7.7 G/DL
RBC # BLD: 5.01 M/UL
RBC # FLD: 12.7 %
SODIUM SERPL-SCNC: 139 MMOL/L
TRIGL SERPL-MCNC: 47 MG/DL
WBC # FLD AUTO: 8.58 K/UL

## 2021-05-17 PROCEDURE — 99214 OFFICE O/P EST MOD 30 MIN: CPT | Mod: 25

## 2021-05-17 PROCEDURE — 82962 GLUCOSE BLOOD TEST: CPT

## 2021-05-17 NOTE — ASSESSMENT
[FreeTextEntry1] : 76 year old female with past medical history of Diabetes Mellitus Type 2, CAD s/p FRANCISCO J (6/2020)  who presents for management of her diabetes\par \par 1. DM 2- uncontrolled, uncomplicated\par Patient counseled on the importance of diabetic control and complications. Patient's diet and the necessary changes discussed. Reviewed over glycemic goals. Given recent CAD will aim for A1C <7\par \par Inc to Metformin 500 mg BID\par Check fsg  BID\par Cont diabetic diet\par Cont exercise\par will check microalbumin\par Opthalmology Visit needs to follow up\par Foot Exam up to date\par \par 2. HLD- stable\par Cont atorvastatin\par \par 3.HTN\par Cont Losartan and Metoprolol. \par \par Follow up in 3 months\par \par \par

## 2021-05-17 NOTE — HISTORY OF PRESENT ILLNESS
[FreeTextEntry1] : Ms. DALIA GREENE  is a 76 year old female with past medical history of Diabetes Mellitus Type 2, CAD s/p FRANCISCO J (6/2020)  who presents for management of her diabetes. Patient recently had multiple tooth infections. \par \par \par POCT Glucose: 115 mg/dL\par POCT Hga1c: %\par \par \par Diabetes first diagnosed:8/2020\par Type:2\par \par Current diabetic regimen:\par Metformin 500 mg QD\par Other relevant medications:\par \par Self monitoring blood glucose : Patient checks few times a day\par \par SMBG:\par Pre-breakfast: 116,159,160,148,139,148, 135, 147\par Pre-lunch:\par Pre-dinner:\par bedtime:169,185, 180\par \par Hypoglycemia:\par \par \par Diet:\par Breakfast: english muffin with jam\par Lunch: half a roll with hot dog, egg and ham sandwich\par Dinner: meat, veggies, baked potato\par Snacks: ice cream \par \par Exercise:\par \par Urine micro:WNL (5/2021), wnl (11/16/20)\par lipid profile:LDL 70 (5/2021), LDL 64 (8/2020)\par last hba1c: 7.4% (5/2021), 6.8% (2/11/21), 7% (11/2020),8.4% (8/2020)\par eye exam: none\par diabetic foot exam/podiatry: in office (9/2020)\par \par \par \par \par

## 2021-06-01 ENCOUNTER — NON-APPOINTMENT (OUTPATIENT)
Age: 76
End: 2021-06-01

## 2021-08-17 ENCOUNTER — NON-APPOINTMENT (OUTPATIENT)
Age: 76
End: 2021-08-17

## 2021-08-17 RX ORDER — CLOPIDOGREL BISULFATE 75 MG/1
75 TABLET, FILM COATED ORAL DAILY
Qty: 90 | Refills: 3 | Status: DISCONTINUED | COMMUNITY
Start: 1900-01-01 | End: 2021-08-17

## 2021-09-17 ENCOUNTER — TRANSCRIPTION ENCOUNTER (OUTPATIENT)
Age: 76
End: 2021-09-17

## 2021-09-29 ENCOUNTER — APPOINTMENT (OUTPATIENT)
Dept: ENDOCRINOLOGY | Facility: CLINIC | Age: 76
End: 2021-09-29
Payer: MEDICARE

## 2021-09-29 VITALS
HEART RATE: 82 BPM | HEIGHT: 64 IN | BODY MASS INDEX: 28.51 KG/M2 | SYSTOLIC BLOOD PRESSURE: 122 MMHG | OXYGEN SATURATION: 97 % | WEIGHT: 167 LBS | DIASTOLIC BLOOD PRESSURE: 76 MMHG

## 2021-09-29 PROCEDURE — 82962 GLUCOSE BLOOD TEST: CPT

## 2021-09-29 PROCEDURE — 99214 OFFICE O/P EST MOD 30 MIN: CPT | Mod: 25

## 2021-09-29 PROCEDURE — 83036 HEMOGLOBIN GLYCOSYLATED A1C: CPT | Mod: QW

## 2021-09-29 NOTE — HISTORY OF PRESENT ILLNESS
[FreeTextEntry1] : Ms. DALIA GREENE  is a 76 year old female with past medical history of Diabetes Mellitus Type 2, CAD s/p FRANCISCO J (6/2020)  who presents for management of her diabetes. Patient recently had multiple tooth infections. \par \par \par POCT Glucose: 92 mg/dL\par POCT Hga1c: 6.9 %\par \par \par Diabetes first diagnosed:8/2020\par Type:2\par \par Current diabetic regimen:\par Metformin 750 mg ER QD\par Other relevant medications:\par \par Self monitoring blood glucose : Patient checks few times a day\par \par SMBG:\par Pre-breakfast: 124-126\par Pre-lunch:\par Pre-dinner:\par bedtime:\par \par Hypoglycemia:\par \par \par Diet:\par Breakfast: english muffin with jam\par Lunch: half a roll with hot dog, egg and ham sandwich\par Dinner: meat, veggies, baked potato\par Snacks: ice cream \par \par Exercise:\par \par Urine micro:WNL (5/2021), wnl (11/16/20)\par lipid profile:LDL 70 (5/2021), LDL 64 (8/2020)\par last hba1c: 6.8% (9/2021), 7.4% (5/2021), 6.8% (2/11/21), 7% (11/2020),8.4% (8/2020)\par eye exam: 9/2021\par diabetic foot exam/podiatry: in office (9/2020)\par \par \par \par \par

## 2021-09-29 NOTE — ASSESSMENT
[FreeTextEntry1] : 76 year old female with past medical history of Diabetes Mellitus Type 2, CAD s/p FRANCISCO J (6/2020)  who presents for management of her diabetes\par \par 1. DM 2- uncontrolled, uncomplicated\par Patient counseled on the importance of diabetic control and complications. Patient's diet and the necessary changes discussed. Reviewed over glycemic goals. Given recent CAD will aim for A1C <7\par \par Inc to Metformin  mg BID\par Check fsg  BID\par Cont diabetic diet\par Cont exercise\par labs UTD\par Opthalmology Visit up to date\par Foot Exam up to date\par \par 2. HLD- stable\par Cont atorvastatin\par \par 3.HTN\par Cont Losartan and Metoprolol. \par \par Follow up in 3 months\par \par \par

## 2021-11-10 ENCOUNTER — APPOINTMENT (OUTPATIENT)
Dept: CARDIOLOGY | Facility: CLINIC | Age: 76
End: 2021-11-10
Payer: MEDICARE

## 2021-11-10 ENCOUNTER — NON-APPOINTMENT (OUTPATIENT)
Age: 76
End: 2021-11-10

## 2021-11-10 VITALS
HEIGHT: 64 IN | BODY MASS INDEX: 28.17 KG/M2 | SYSTOLIC BLOOD PRESSURE: 151 MMHG | WEIGHT: 165 LBS | DIASTOLIC BLOOD PRESSURE: 82 MMHG | OXYGEN SATURATION: 99 % | HEART RATE: 75 BPM

## 2021-11-10 PROCEDURE — 99214 OFFICE O/P EST MOD 30 MIN: CPT

## 2021-11-10 PROCEDURE — 93000 ELECTROCARDIOGRAM COMPLETE: CPT

## 2021-11-10 NOTE — HISTORY OF PRESENT ILLNESS
[FreeTextEntry1] : Kristan presented to the office today for a cardiovascular evaluation. I saw her 6 months ago.\par \par She is now 76 years old, with a history of anxiety and depression. She has been taking nortriptyline and Klonopin. She has not seen doctors frequently. She presented to the hospital June 5 with chest discomfort and elevated troponin. She was transferred for cardiac catheterization, was found to have an occluded diagonal branch. She had PCI. Her ejection fraction post MI was normal. She was discharged.\par \par I saw her in the office in November and her blood pressure was elevated. I added chlorthalidone. Several days later, in the setting of a dental infection, she had an episode of syncope. Chlorthalidone was discontinued.  After that, I increased amlodipine.\par \par She has been feeling well since the last visit.  She is now off clopidogrel, and remains on aspirin. Her blood pressure has remained controlled.  She has not been lightheaded.  She remains otherwise well, without symptoms of angina or heart failure.

## 2021-11-10 NOTE — DISCUSSION/SUMMARY
[FreeTextEntry1] : She is feeling well following her presentation with an acute coronary syndrome. Her blood pressure is controlled at home, though it is certainly somewhat elevated here.\par \par She does not have any recurrent symptoms of angina, heart failure or arrhythmia.  She will continue to try to increase her physical activity.\par \par I will make no changes at this time.  I have suggested another echocardiogram to reevaluate left ventricular wall thickness and systolic function.  I do not think stress testing is necessary at this time.\par \par If all is well, she will see me in 6 months.  She will see me sooner if the need arises.

## 2021-12-01 ENCOUNTER — APPOINTMENT (OUTPATIENT)
Dept: CARDIOLOGY | Facility: CLINIC | Age: 76
End: 2021-12-01

## 2021-12-02 ENCOUNTER — APPOINTMENT (OUTPATIENT)
Dept: CARDIOLOGY | Facility: CLINIC | Age: 76
End: 2021-12-02
Payer: MEDICARE

## 2021-12-02 PROCEDURE — 93306 TTE W/DOPPLER COMPLETE: CPT

## 2022-01-03 ENCOUNTER — APPOINTMENT (OUTPATIENT)
Dept: ENDOCRINOLOGY | Facility: CLINIC | Age: 77
End: 2022-01-03

## 2022-01-11 ENCOUNTER — APPOINTMENT (OUTPATIENT)
Dept: ENDOCRINOLOGY | Facility: CLINIC | Age: 77
End: 2022-01-11
Payer: MEDICARE

## 2022-01-11 VITALS
HEIGHT: 64 IN | OXYGEN SATURATION: 99 % | SYSTOLIC BLOOD PRESSURE: 147 MMHG | WEIGHT: 165 LBS | HEART RATE: 83 BPM | DIASTOLIC BLOOD PRESSURE: 85 MMHG | BODY MASS INDEX: 28.17 KG/M2

## 2022-01-11 PROCEDURE — 82962 GLUCOSE BLOOD TEST: CPT

## 2022-01-11 PROCEDURE — 99214 OFFICE O/P EST MOD 30 MIN: CPT | Mod: 25

## 2022-01-11 PROCEDURE — 83036 HEMOGLOBIN GLYCOSYLATED A1C: CPT | Mod: QW

## 2022-01-11 NOTE — HISTORY OF PRESENT ILLNESS
[FreeTextEntry1] : Ms. DALIA GREENE  is a 76 year old female with past medical history of Diabetes Mellitus Type 2, CAD s/p FRANCISCO J (6/2020)  who presents for management of her diabetes. \par \par \par POCT Glucose: 105 mg/dL\par POCT Hga1c: 6.7 %\par \par \par Diabetes first diagnosed:8/2020\par Type:2\par \par Current diabetic regimen:\par Metformin 500 mg BID\par Other relevant medications:\par \par Self monitoring blood glucose : Patient checks few times a day\par \par SMBG:\par Pre-breakfast: 130s\par Pre-lunch:\par Pre-dinner:\par bedtime:\par \par Hypoglycemia:\par \par \par Diet:\par Breakfast: english muffin with jam\par Lunch: half a roll with hot dog, egg and ham sandwich\par Dinner: meat, veggies, baked potato\par Snacks: ice cream \par \par Exercise:\par \par Urine micro:WNL (5/2021), wnl (11/16/20)\par lipid profile:LDL 70 (5/2021), LDL 64 (8/2020)\par last hba1c: 6.7% (1/2022), 6.8% (9/2021), 7.4% (5/2021), 6.8% (2/11/21), 7% (11/2020),8.4% (8/2020)\par eye exam: 9/2021\par diabetic foot exam/podiatry: in office (9/2020)\par \par \par \par \par

## 2022-01-11 NOTE — ASSESSMENT
[FreeTextEntry1] : 76 year old female with past medical history of Diabetes Mellitus Type 2, CAD s/p FRANCISCO J (6/2020)  who presents for management of her diabetes\par \par 1. DM 2- controlled, uncomplicated\par Patient counseled on the importance of diabetic control and complications. Patient's diet and the necessary changes discussed. Reviewed over glycemic goals. Given recent CAD will aim for A1C <7\par \par Cont Metformin 500 mg BID\par Check fsg  BID\par Cont diabetic diet\par Cont exercise\par labs UTD\par Opthalmology Visit up to date\par Foot Exam up to date\par \par 2. HLD- stable\par Cont atorvastatin\par \par 3.HTN\par Cont Losartan and Metoprolol. \par \par Follow up in 3 months\par \par \par

## 2022-01-14 ENCOUNTER — NON-APPOINTMENT (OUTPATIENT)
Age: 77
End: 2022-01-14

## 2022-01-14 LAB
25(OH)D3 SERPL-MCNC: 19.5 NG/ML
ALBUMIN SERPL ELPH-MCNC: 4.6 G/DL
ALP BLD-CCNC: 95 U/L
ALT SERPL-CCNC: 16 U/L
ANION GAP SERPL CALC-SCNC: 14 MMOL/L
AST SERPL-CCNC: 15 U/L
BASOPHILS # BLD AUTO: 0.13 K/UL
BASOPHILS NFR BLD AUTO: 1 %
BILIRUB SERPL-MCNC: 0.3 MG/DL
BUN SERPL-MCNC: 19 MG/DL
CALCIUM SERPL-MCNC: 10.2 MG/DL
CHLORIDE SERPL-SCNC: 105 MMOL/L
CHOLEST SERPL-MCNC: 141 MG/DL
CO2 SERPL-SCNC: 23 MMOL/L
CREAT SERPL-MCNC: 0.85 MG/DL
CREAT SPEC-SCNC: 119 MG/DL
EOSINOPHIL # BLD AUTO: 0.7 K/UL
EOSINOPHIL NFR BLD AUTO: 5.6 %
FRUCTOSAMINE SERPL-MCNC: 301 UMOL/L
GLUCOSE SERPL-MCNC: 120 MG/DL
HCT VFR BLD CALC: 43.2 %
HDLC SERPL-MCNC: 49 MG/DL
HGB BLD-MCNC: 13.8 G/DL
IMM GRANULOCYTES NFR BLD AUTO: 0.2 %
LDLC SERPL CALC-MCNC: 73 MG/DL
LYMPHOCYTES # BLD AUTO: 3.35 K/UL
LYMPHOCYTES NFR BLD AUTO: 26.8 %
MAN DIFF?: NORMAL
MCHC RBC-ENTMCNC: 28.4 PG
MCHC RBC-ENTMCNC: 31.9 GM/DL
MCV RBC AUTO: 88.9 FL
MICROALBUMIN 24H UR DL<=1MG/L-MCNC: 2.7 MG/DL
MICROALBUMIN/CREAT 24H UR-RTO: 23 MG/G
MONOCYTES # BLD AUTO: 0.85 K/UL
MONOCYTES NFR BLD AUTO: 6.8 %
NEUTROPHILS # BLD AUTO: 7.46 K/UL
NEUTROPHILS NFR BLD AUTO: 59.6 %
NONHDLC SERPL-MCNC: 93 MG/DL
PLATELET # BLD AUTO: 398 K/UL
POTASSIUM SERPL-SCNC: 4.7 MMOL/L
PROT SERPL-MCNC: 7.7 G/DL
RBC # BLD: 4.86 M/UL
RBC # FLD: 13.1 %
SODIUM SERPL-SCNC: 142 MMOL/L
TRIGL SERPL-MCNC: 100 MG/DL
WBC # FLD AUTO: 12.52 K/UL

## 2022-04-12 ENCOUNTER — APPOINTMENT (OUTPATIENT)
Dept: ENDOCRINOLOGY | Facility: CLINIC | Age: 77
End: 2022-04-12
Payer: MEDICARE

## 2022-04-12 ENCOUNTER — RESULT CHARGE (OUTPATIENT)
Age: 77
End: 2022-04-12

## 2022-04-12 VITALS
WEIGHT: 166 LBS | HEART RATE: 67 BPM | DIASTOLIC BLOOD PRESSURE: 71 MMHG | SYSTOLIC BLOOD PRESSURE: 110 MMHG | OXYGEN SATURATION: 98 % | BODY MASS INDEX: 28.49 KG/M2

## 2022-04-12 PROCEDURE — 82962 GLUCOSE BLOOD TEST: CPT

## 2022-04-12 PROCEDURE — 99214 OFFICE O/P EST MOD 30 MIN: CPT | Mod: 25

## 2022-04-12 NOTE — HISTORY OF PRESENT ILLNESS
[FreeTextEntry1] : Ms. DALIA GREENE  is a 77 year old female with past medical history of Diabetes Mellitus Type 2, CAD s/p FRANCISCO J (6/2020)  who presents for management of her diabetes. \par \par \par POCT Glucose: 156 mg/dL\par POCT Hga1c: 6.9 %\par \par \par Diabetes first diagnosed:8/2020\par Type:2\par \par Current diabetic regimen:\par Metformin 500 mg BID\par Other relevant medications:\par \par Self monitoring blood glucose : Patient checks few times a day\par \par SMBG:\par Pre-breakfast: 130s\par Pre-lunch:\par Pre-dinner:\par bedtime:\par \par Hypoglycemia:\par \par \par Diet:\par Breakfast: english muffin with jam\par Lunch: half a roll with hot dog, egg and ham sandwich\par Dinner: meat, veggies, baked potato\par Snacks: ice cream \par \par Exercise:\par \par Urine micro:WNL (5/2021), wnl (11/16/20)\par lipid profile:LDL 70 (5/2021), LDL 64 (8/2020)\par last hba1c: 6.7% (1/2022), 6.8% (9/2021), 7.4% (5/2021), 6.8% (2/11/21), 7% (11/2020),8.4% (8/2020)\par eye exam: 9/2021\par diabetic foot exam/podiatry: in office (9/2020)\par \par \par \par \par

## 2022-04-12 NOTE — ASSESSMENT
[FreeTextEntry1] : 77 year old female with past medical history of Diabetes Mellitus Type 2, CAD s/p FRANCISCO J (6/2020)  who presents for management of her diabetes\par \par 1. DM 2- controlled, uncomplicated\par Patient counseled on the importance of diabetic control and complications. Patient's diet and the necessary changes discussed. Reviewed over glycemic goals. Given recent CAD will aim for A1C <7\par \par Cont Metformin 500 mg BID\par Check fsg  BID\par Cont diabetic diet\par Cont exercise\par labs UTD\par Opthalmology Visit up to date\par Foot Exam up to date\par \par 2. HLD- stable\par Cont atorvastatin\par \par 3.HTN\par Cont Losartan and Metoprolol. \par \par 4. Vitamin D deficiency\par Cont Vitamin D\par \par Follow up in 3 months\par \par \par

## 2022-05-26 ENCOUNTER — APPOINTMENT (OUTPATIENT)
Dept: CARDIOLOGY | Facility: CLINIC | Age: 77
End: 2022-05-26
Payer: MEDICARE

## 2022-05-26 ENCOUNTER — NON-APPOINTMENT (OUTPATIENT)
Age: 77
End: 2022-05-26

## 2022-05-26 VITALS
HEIGHT: 64 IN | SYSTOLIC BLOOD PRESSURE: 151 MMHG | OXYGEN SATURATION: 98 % | HEART RATE: 74 BPM | WEIGHT: 165 LBS | DIASTOLIC BLOOD PRESSURE: 81 MMHG | BODY MASS INDEX: 28.17 KG/M2

## 2022-05-26 VITALS — SYSTOLIC BLOOD PRESSURE: 140 MMHG | DIASTOLIC BLOOD PRESSURE: 90 MMHG

## 2022-05-26 PROCEDURE — 99214 OFFICE O/P EST MOD 30 MIN: CPT

## 2022-05-26 PROCEDURE — 93000 ELECTROCARDIOGRAM COMPLETE: CPT

## 2022-05-26 NOTE — HISTORY OF PRESENT ILLNESS
[FreeTextEntry1] : Kristan presented to the office today for a cardiovascular evaluation. I saw her 6 months ago.\par \par She is now 77 years old, with a history of anxiety and depression. She has been taking nortriptyline and Klonopin. She has not seen doctors frequently. She presented to the hospital June 5 with chest discomfort and elevated troponin. She was transferred for cardiac catheterization, was found to have an occluded diagonal branch. She had PCI. Her ejection fraction post MI was normal. She was discharged.\par \par I saw her in the office in November, 2021 and her blood pressure was elevated. I added chlorthalidone. Several days later, in the setting of a dental infection, she had an episode of syncope. Chlorthalidone was discontinued.  After that, I increased amlodipine.  At her most recent visit in November, 2021, her blood pressure was elevated here, but controlled at home.  A follow-up echocardiogram was performed, which revealed a normal ejection fraction.\par \par She has been feeling well since the last visit.  She remains on aspirin. Her blood pressure has been generally controlled, but is often in the 130s.  She has not been lightheaded.  She remains otherwise well, without symptoms of angina or heart failure. Her A1C was reasonable the last time it was checked.

## 2022-05-26 NOTE — DISCUSSION/SUMMARY
[FreeTextEntry1] : She is feeling well following her presentation with an acute coronary syndrome. Her blood pressure is t elevated here.  It is in the 130 range at home.  I explained that our goal is about 120.  Her medications are maximized at the moment, and I do not want to start clonidine or hydralazine given its inconvenience, will resume a diuretic, which resulted in syncope the last time.  She will focus on exercise, dietary restriction of sodium, and exercise.  Hopefully that will suffice, and we will reevaluate this over time.\par \par She does not have any recurrent symptoms of angina, heart failure or arrhythmia.  She will continue to try to increase her physical activity.\par \par I will make no changes at this time.  I reviewed her most recent echocardiogram from December, as well as her most recent catheterization from 2020, and her most recent blood work.\par \par If all is well, she will see me in 6 months.  She will see me sooner if the need arises.

## 2022-08-16 ENCOUNTER — APPOINTMENT (OUTPATIENT)
Dept: ENDOCRINOLOGY | Facility: CLINIC | Age: 77
End: 2022-08-16

## 2022-09-07 PROBLEM — Z00.00 ENCOUNTER FOR PREVENTIVE HEALTH EXAMINATION: Status: ACTIVE | Noted: 2022-09-07

## 2022-09-08 ENCOUNTER — APPOINTMENT (OUTPATIENT)
Dept: ENDOCRINOLOGY | Facility: CLINIC | Age: 77
End: 2022-09-08

## 2022-09-08 ENCOUNTER — RESULT CHARGE (OUTPATIENT)
Age: 77
End: 2022-09-08

## 2022-09-08 VITALS
DIASTOLIC BLOOD PRESSURE: 83 MMHG | SYSTOLIC BLOOD PRESSURE: 149 MMHG | WEIGHT: 165 LBS | HEART RATE: 93 BPM | OXYGEN SATURATION: 95 % | HEIGHT: 64 IN | BODY MASS INDEX: 28.17 KG/M2

## 2022-09-08 PROCEDURE — 82962 GLUCOSE BLOOD TEST: CPT

## 2022-09-08 PROCEDURE — 83036 HEMOGLOBIN GLYCOSYLATED A1C: CPT | Mod: QW

## 2022-09-08 PROCEDURE — 99214 OFFICE O/P EST MOD 30 MIN: CPT | Mod: 25

## 2022-09-13 ENCOUNTER — APPOINTMENT (OUTPATIENT)
Dept: ENDOCRINOLOGY | Facility: CLINIC | Age: 77
End: 2022-09-13

## 2022-11-04 ENCOUNTER — NON-APPOINTMENT (OUTPATIENT)
Age: 77
End: 2022-11-04

## 2022-11-04 LAB
25(OH)D3 SERPL-MCNC: 21.4 NG/ML
ALBUMIN SERPL ELPH-MCNC: 4.2 G/DL
ALP BLD-CCNC: 82 U/L
ALT SERPL-CCNC: 39 U/L
ANION GAP SERPL CALC-SCNC: 13 MMOL/L
AST SERPL-CCNC: 34 U/L
BASOPHILS # BLD AUTO: 0.09 K/UL
BASOPHILS NFR BLD AUTO: 0.9 %
BILIRUB SERPL-MCNC: 0.3 MG/DL
BUN SERPL-MCNC: 22 MG/DL
CALCIUM SERPL-MCNC: 10 MG/DL
CHLORIDE SERPL-SCNC: 103 MMOL/L
CHOLEST SERPL-MCNC: 130 MG/DL
CO2 SERPL-SCNC: 23 MMOL/L
CREAT SERPL-MCNC: 0.81 MG/DL
CREAT SPEC-SCNC: 171 MG/DL
EGFR: 74 ML/MIN/1.73M2
EOSINOPHIL # BLD AUTO: 0.34 K/UL
EOSINOPHIL NFR BLD AUTO: 3.4 %
ESTIMATED AVERAGE GLUCOSE: 151 MG/DL
FOLATE SERPL-MCNC: 3.6 NG/ML
FRUCTOSAMINE SERPL-MCNC: 255 UMOL/L
GLUCOSE SERPL-MCNC: 140 MG/DL
HBA1C MFR BLD HPLC: 6.9 %
HCT VFR BLD CALC: 45 %
HDLC SERPL-MCNC: 42 MG/DL
HGB BLD-MCNC: 14.2 G/DL
IMM GRANULOCYTES NFR BLD AUTO: 0.2 %
LDLC SERPL CALC-MCNC: 67 MG/DL
LYMPHOCYTES # BLD AUTO: 2.4 K/UL
LYMPHOCYTES NFR BLD AUTO: 24 %
MAN DIFF?: NORMAL
MCHC RBC-ENTMCNC: 28.6 PG
MCHC RBC-ENTMCNC: 31.6 GM/DL
MCV RBC AUTO: 90.7 FL
MICROALBUMIN 24H UR DL<=1MG/L-MCNC: 2.3 MG/DL
MICROALBUMIN/CREAT 24H UR-RTO: 13 MG/G
MONOCYTES # BLD AUTO: 0.75 K/UL
MONOCYTES NFR BLD AUTO: 7.5 %
NEUTROPHILS # BLD AUTO: 6.42 K/UL
NEUTROPHILS NFR BLD AUTO: 64 %
NONHDLC SERPL-MCNC: 88 MG/DL
PLATELET # BLD AUTO: 336 K/UL
POTASSIUM SERPL-SCNC: 4.6 MMOL/L
PROT SERPL-MCNC: 7.3 G/DL
RBC # BLD: 4.96 M/UL
RBC # FLD: 13 %
SODIUM SERPL-SCNC: 139 MMOL/L
TRIGL SERPL-MCNC: 103 MG/DL
TSH SERPL-ACNC: 2.53 UIU/ML
VIT B12 SERPL-MCNC: 413 PG/ML
WBC # FLD AUTO: 10.02 K/UL

## 2022-11-07 ENCOUNTER — NON-APPOINTMENT (OUTPATIENT)
Age: 77
End: 2022-11-07

## 2022-11-07 ENCOUNTER — APPOINTMENT (OUTPATIENT)
Dept: CARDIOLOGY | Facility: CLINIC | Age: 77
End: 2022-11-07

## 2022-11-07 VITALS
WEIGHT: 162 LBS | OXYGEN SATURATION: 99 % | DIASTOLIC BLOOD PRESSURE: 77 MMHG | HEIGHT: 64 IN | HEART RATE: 71 BPM | BODY MASS INDEX: 27.66 KG/M2 | SYSTOLIC BLOOD PRESSURE: 144 MMHG

## 2022-11-07 PROCEDURE — 93000 ELECTROCARDIOGRAM COMPLETE: CPT

## 2022-11-07 PROCEDURE — 99214 OFFICE O/P EST MOD 30 MIN: CPT

## 2022-11-07 NOTE — DISCUSSION/SUMMARY
[FreeTextEntry1] : She is feeling well following her presentation with an acute coronary syndrome. Her blood pressure is t elevated here.  It is in the 130 range at home.  I explained that our goal is about 120.  Her medications are maximized at the moment, and I do not want to start clonidine or hydralazine given its inconvenience, will resume a diuretic, which resulted in syncope the last time.  She will focus on exercise, dietary restriction of sodium, and exercise.  Hopefully that will suffice, and we will reevaluate this over time.\par \par She does not have any recurrent symptoms of angina, heart failure or arrhythmia.  She will continue to try to increase her physical activity.\par \par I will make no changes at this time.  I reviewed her most recent echocardiogram from December, as well as her most recent catheterization from 2020, and her most recent blood work.\par \par If all is well, she will see me in 6 months.  She will see me sooner if the need arises.\par \par If she requires cataract surgery, she is considered optimized for this low risk procedure.  Her medications will be continued perioperatively.  Routine hemodynamic monitoring is recommended.

## 2022-11-07 NOTE — HISTORY OF PRESENT ILLNESS
[FreeTextEntry1] : Kristan presented to the office today for a cardiovascular evaluation. I saw her 6 months ago.\par \par She is now 77 years old, with a history of anxiety and depression. She has been taking nortriptyline and Klonopin. She has not seen doctors frequently. She presented to the hospital June 5 with chest discomfort and elevated troponin. She was transferred for cardiac catheterization, was found to have an occluded diagonal branch. She had PCI. Her ejection fraction post MI was normal. She was discharged.\par \par I saw her in the office in November, 2021 and her blood pressure was elevated. I added chlorthalidone. Several days later, in the setting of a dental infection, she had an episode of syncope. Chlorthalidone was discontinued.  After that, I increased amlodipine.  At her visit in November, 2021, her blood pressure was elevated here, but controlled at home.  A follow-up echocardiogram was performed, which revealed a normal ejection fraction.\par \par She has been feeling well since the last visit.  She had COVID in July, and got remdisivir. She had a high fever at first, and then her symptoms were mostly fatigue. She remains on aspirin. Her blood pressure has been generally controlled, often in the 130 range.  She has not been lightheaded.  She remains otherwise well, without symptoms of angina or heart failure.  Her most recent cholesterol profile and hemoglobin A1c were reasonable.\par \par She is planning on cataract surgery.

## 2022-12-28 RX ORDER — NORTRIPTYLINE HYDROCHLORIDE 10 MG/1
0 CAPSULE ORAL
Qty: 0 | Refills: 0 | DISCHARGE

## 2022-12-28 RX ORDER — CLONAZEPAM 1 MG
1 TABLET ORAL
Qty: 0 | Refills: 0 | DISCHARGE

## 2022-12-28 RX ORDER — NORTRIPTYLINE HYDROCHLORIDE 10 MG/1
100 CAPSULE ORAL
Qty: 0 | Refills: 0 | DISCHARGE

## 2022-12-28 RX ORDER — TOBRAMYCIN 0.3 %
1 DROPS OPHTHALMIC (EYE)
Qty: 0 | Refills: 0 | DISCHARGE

## 2022-12-28 RX ORDER — CLONAZEPAM 1 MG
0 TABLET ORAL
Qty: 0 | Refills: 0 | DISCHARGE

## 2023-01-09 ENCOUNTER — APPOINTMENT (OUTPATIENT)
Dept: ENDOCRINOLOGY | Facility: CLINIC | Age: 78
End: 2023-01-09
Payer: MEDICARE

## 2023-01-09 VITALS
HEART RATE: 82 BPM | WEIGHT: 163 LBS | BODY MASS INDEX: 27.98 KG/M2 | SYSTOLIC BLOOD PRESSURE: 115 MMHG | DIASTOLIC BLOOD PRESSURE: 70 MMHG | OXYGEN SATURATION: 96 %

## 2023-01-09 PROCEDURE — 82962 GLUCOSE BLOOD TEST: CPT

## 2023-01-09 PROCEDURE — 99214 OFFICE O/P EST MOD 30 MIN: CPT | Mod: 25

## 2023-01-09 NOTE — HISTORY OF PRESENT ILLNESS
[FreeTextEntry1] : Ms. DALIA GREENE  is a 77 year old female with past medical history of Diabetes Mellitus Type 2, CAD s/p FRANCISCO J (6/2020)  who presents for management of her diabetes. \par \par \par POCT Glucose: 139 mg/dL\par POCT Hga1c: %\par \par \par Diabetes first diagnosed:8/2020\par Type:2\par \par Current diabetic regimen:\par Metformin 500 mg BID\par Other relevant medications:\par \par Self monitoring blood glucose : Patient checks few times a day\par \par SMBG:\par Pre-breakfast: 130s\par Pre-lunch:\par Pre-dinner:\par bedtime:\par \par Hypoglycemia:none\par \par Diet:\par Breakfast: english muffin with jam\par Lunch: half a roll with hot dog, egg and ham sandwich\par Dinner: meat, veggies, baked potato\par Snacks: ice cream \par \par Exercise:\par \par Urine micro:WNL (5/2021), wnl (11/16/20)\par lipid profile:LDL 70 (5/2021), LDL 64 (8/2020)\par last hba1c: 6.7% (1/2022), 6.8% (9/2021), 7.4% (5/2021), 6.8% (2/11/21), 7% (11/2020),8.4% (8/2020)\par eye exam: 2022\par diabetic foot exam/podiatry: in office (9/2020)\par \par \par \par \par

## 2023-01-09 NOTE — ASSESSMENT
[FreeTextEntry1] : 77 year old female with past medical history of Diabetes Mellitus Type 2, CAD s/p FRANCISCO J (6/2020)  who presents for management of her diabetes\par \par 1. DM 2- controlled, uncomplicated\par Patient counseled on the importance of diabetic control and complications. Patient's diet and the necessary changes discussed. Reviewed over glycemic goals. Given recent CAD will aim for A1C <7\par \par Cont Metformin 500 mg BID\par Check fsg  BID\par Cont diabetic diet\par Cont exercise\par labs today\par blood drawn in the office\par Opthalmology Visit up to date\par Foot Exam up to date\par Patient refuses bone density at this time\par \par 2. HLD- stable\par Cont atorvastatin\par \par 3.HTN\par Cont Losartan and Metoprolol. \par \par 4. Vitamin D deficiency\par Cont Vitamin D\par \par Follow up in 3 months\par \par \par

## 2023-01-11 ENCOUNTER — NON-APPOINTMENT (OUTPATIENT)
Age: 78
End: 2023-01-11

## 2023-01-11 LAB
25(OH)D3 SERPL-MCNC: 22.5 NG/ML
ALBUMIN SERPL ELPH-MCNC: 4.5 G/DL
ALP BLD-CCNC: 85 U/L
ALT SERPL-CCNC: 46 U/L
ANION GAP SERPL CALC-SCNC: 13 MMOL/L
AST SERPL-CCNC: 43 U/L
BASOPHILS # BLD AUTO: 0.08 K/UL
BASOPHILS NFR BLD AUTO: 0.9 %
BILIRUB SERPL-MCNC: 0.3 MG/DL
BUN SERPL-MCNC: 18 MG/DL
CALCIUM SERPL-MCNC: 10 MG/DL
CHLORIDE SERPL-SCNC: 104 MMOL/L
CO2 SERPL-SCNC: 22 MMOL/L
CREAT SERPL-MCNC: 0.88 MG/DL
CREAT SPEC-SCNC: 75 MG/DL
EGFR: 67 ML/MIN/1.73M2
EOSINOPHIL # BLD AUTO: 0.33 K/UL
EOSINOPHIL NFR BLD AUTO: 3.8 %
ESTIMATED AVERAGE GLUCOSE: 148 MG/DL
FOLATE SERPL-MCNC: 7.6 NG/ML
FRUCTOSAMINE SERPL-MCNC: 261 UMOL/L
GLUCOSE SERPL-MCNC: 128 MG/DL
HBA1C MFR BLD HPLC: 6.8 %
HCT VFR BLD CALC: 44.3 %
HGB BLD-MCNC: 13.9 G/DL
IMM GRANULOCYTES NFR BLD AUTO: 0.2 %
LYMPHOCYTES # BLD AUTO: 2.16 K/UL
LYMPHOCYTES NFR BLD AUTO: 24.8 %
MAN DIFF?: NORMAL
MCHC RBC-ENTMCNC: 27.9 PG
MCHC RBC-ENTMCNC: 31.4 GM/DL
MCV RBC AUTO: 89 FL
MICROALBUMIN 24H UR DL<=1MG/L-MCNC: <1.2 MG/DL
MICROALBUMIN/CREAT 24H UR-RTO: NORMAL MG/G
MONOCYTES # BLD AUTO: 0.74 K/UL
MONOCYTES NFR BLD AUTO: 8.5 %
NEUTROPHILS # BLD AUTO: 5.37 K/UL
NEUTROPHILS NFR BLD AUTO: 61.8 %
PLATELET # BLD AUTO: 300 K/UL
POTASSIUM SERPL-SCNC: 4.2 MMOL/L
PROT SERPL-MCNC: 7.4 G/DL
RBC # BLD: 4.98 M/UL
RBC # FLD: 13.4 %
SODIUM SERPL-SCNC: 139 MMOL/L
TSH SERPL-ACNC: 2.17 UIU/ML
VIT B12 SERPL-MCNC: 433 PG/ML
WBC # FLD AUTO: 8.7 K/UL

## 2023-01-13 NOTE — ED ADULT NURSE NOTE - NS ED NURSE RECORD ANOTHER HT AND WT
For information on Fall & Injury Prevention, visit: [email or web address] OR  [email or web address] OR  https://www.cdc.gov/steadi/patient.html
Yes

## 2023-02-13 PROBLEM — F41.9 ANXIETY DISORDER, UNSPECIFIED: Chronic | Status: ACTIVE | Noted: 2020-06-05

## 2023-02-13 PROBLEM — F32.9 MAJOR DEPRESSIVE DISORDER, SINGLE EPISODE, UNSPECIFIED: Chronic | Status: ACTIVE | Noted: 2020-06-05

## 2023-02-14 ENCOUNTER — APPOINTMENT (OUTPATIENT)
Dept: OTOLARYNGOLOGY | Facility: CLINIC | Age: 78
End: 2023-02-14
Payer: MEDICARE

## 2023-02-14 VITALS — WEIGHT: 158 LBS | BODY MASS INDEX: 26.98 KG/M2 | TEMPERATURE: 97 F | HEIGHT: 64 IN

## 2023-02-14 DIAGNOSIS — H90.A22 SENSORINEURAL HEARING LOSS, UNILATERAL, LEFT EAR, WITH RESTRICTED HEARING ON THE CONTRALATERAL SIDE: ICD-10-CM

## 2023-02-14 DIAGNOSIS — H90.3 SENSORINEURAL HEARING LOSS, BILATERAL: ICD-10-CM

## 2023-02-14 DIAGNOSIS — H93.8X2 OTHER SPECIFIED DISORDERS OF LEFT EAR: ICD-10-CM

## 2023-02-14 DIAGNOSIS — H69.82 OTHER SPECIFIED DISORDERS OF EUSTACHIAN TUBE, LEFT EAR: ICD-10-CM

## 2023-02-14 DIAGNOSIS — H61.21 IMPACTED CERUMEN, RIGHT EAR: ICD-10-CM

## 2023-02-14 PROCEDURE — 99214 OFFICE O/P EST MOD 30 MIN: CPT | Mod: 25

## 2023-02-14 PROCEDURE — 92557 COMPREHENSIVE HEARING TEST: CPT

## 2023-02-14 PROCEDURE — 92567 TYMPANOMETRY: CPT

## 2023-02-14 PROCEDURE — G0268 REMOVAL OF IMPACTED WAX MD: CPT

## 2023-02-14 RX ORDER — FLUTICASONE PROPIONATE 50 UG/1
50 SPRAY, METERED NASAL DAILY
Qty: 1 | Refills: 2 | Status: ACTIVE | COMMUNITY
Start: 2023-02-14 | End: 1900-01-01

## 2023-02-14 NOTE — PHYSICAL EXAM
[Midline] : trachea located in midline position [Normal] : no rashes [de-identified] : right impacted cerumen; left clear  [de-identified] : after cerumen removal

## 2023-02-14 NOTE — ASSESSMENT
[FreeTextEntry1] : Patient with clogged left ear - has cerumen in right ear - removed.  Hx of hearing loss in left ear.   Audio shows asymetric snhl - discussed MRI but did not start steroid since hearing loss for likely several years.  Followup in several mos and as necessary\par Does have As tymp on left so may have ETD and started on floanse.

## 2023-02-14 NOTE — HISTORY OF PRESENT ILLNESS
[de-identified] : c/o clogged ears - worse in left.  Seen at walk in clinic - treated with ear drops.  Sounds under water.   Drops not helping .  No prior ear problems. Also hx of hearing loss for possibly several years in left ear.

## 2023-02-14 NOTE — DATA REVIEWED
[de-identified] : Asymmetric SNHL\par Moderate to severe SNL 5629-0813 Hz right ear/Type A\par Moderate to severe SNHL 250-8000 Hz left ear/Type As

## 2023-02-15 ENCOUNTER — RX RENEWAL (OUTPATIENT)
Age: 78
End: 2023-02-15

## 2023-03-08 NOTE — DISCHARGE NOTE NURSING/CASE MANAGEMENT/SOCIAL WORK - NSDCPETBCESMAN_GEN_ALL_CORE
[x] [facility]  Outpatient Rehabilitation &  Therapy  [facility]   [place]: [phone]  F: [phone]      Physical Therapy Daily Treatment Note    Date:  3/8/2023  Patient Name:  India Emery    :  1955  MRN: [id number]  Physician: Dr. Bryan Valencia: Lily Gandara 12 visits approved from 23 to 23  Medical Diagnosis: Closed displaced fracture of left patella with routine healing, unspecified fracture morphology, subsequent encounter (S82.002D [ICD-10-CM])                                     Rehab Codes: M62.81, R26.89, R60.0, M25.66  Onset date: surgery 23                                        Next Dr's appt. : 3/13/23  Visit# / total visits: 10/12    Cancels/No Shows: 0/0    Subjective:    Pain:  [] Yes  [x] No Location: L knee Pain Rating: (0-10 scale) 0/10  Pain altered Tx:  [x] No  [] Yes  Action:  Comments: Patient arrives with brace donned stating she can move her knee further than 80 degrees without the brace on but understands that she isn't suppose to. Patient states she has no pain upon arrival. She has her follow-up with her doctor on Monday 3/13/23. Objective:   Todays Treatment:  Modalities:   Precautions:   WBAT with brace locked in extension with all weight bearing exercises and gait   Increased 10 degrees each week (start 90 degrees flex 3/8/23)  NO straight leg raises  Exercises:  Exercise    Reps/ Time Weight/ Level Comments             NuStep   10'   Brace unlocked to 80 degrees this date              Mat      Heel Slides  held   Brace on unlocked to 80 degrees    SAQ 10x5\"     Clamshells  x20 Active     SL hip abduction  x10 Active           Parallel Bars      SB gastroc stretch  3x30\"     HS stretch  3x30\"     Standing heel raises  x20       Standing toe raises  x20       Balance Board  3' L2           Gym      3-way hip x15 Lime No flexion    Resisted TKE 10x10\" Blue    SLS Rebounder forward x20 Soccer ball                              Other:    Specific Instructions for next treatment: Progress range to 90 degrees in brace       Treatment Charges: Mins Units   []  Modalities     [x]  Ther Exercise 45 3   []  Manual Therapy     []  Ther Activities     []  Aquatics     [x]  Vasocompression     []  Other     Total Treatment time 45 3       Assessment: [x] Progressing toward goals. Continued with program transitioning patient outside of parallel bars for thera-band strength exercises and rebounder with patient noting increased challenge with balance and strength. Patient continues to have poor recall on protocol and lifts her leg actively with bed mobility requiring re-education. Will continue to increase knee ROM per protocol to 90 degrees flexion next visit. [] No change. [] Other:  [x] Patient would continue to benefit from skilled physical therapy services in order to:  improve L knee ROM and strength, improve gait mechanics without an AD, and decrease pain to ease ADL's and improve quality of life     STG/LTG  Goals  MET NOT MET ON-  GOING  Details   Date Addressed:            STG: To be met in 10 treatments            1. ? Pain: Decrease L knee pain levels to 3/10 with ADLs []  []  []      2. ? ROM: Increase R knee AROM to at least 0-70 deg (per protocol) to reduce difficulty with ADLs []  []  []      3. Patient to demonstrate improve LE flexibility on the LLE compared to the RLE to prepare for gait without AD  []  []  []      4. Independent with Home Exercise Programs []  []  []      5. Patient to demonstrate knowledge of fall prevention  []  []  []        []  []  []      Date Addressed:            LTG: To be met in 20 treatments           1. Improve score on assessment tool KOOS from 86% impairment to less than 50% impairment  []  []  []      2.  Reduce L knee pain levels to 0/10 or less with ADLs []  []  []      3. ? ROM: Increase R knee AROM to at least 0-120 deg (per protocol) to reduce difficulty with ADLs []  []  []      4. Patient to demonstrate gait without hinged knee brace without deficits to ease ADL's  []  []  []      4. LE strength 5/5 to ease return to sport/ADL's []  []  []                      Patient goals: walk without brace         Rehab Potential:  [x] Good  [] Fair  [] Poor   Suggested Professional Referral:  [x] No  [] Yes:  Barriers to Goal Achievement:  [x] No  [] Yes:  Domestic Concerns:  [x] No  [] Yes:      Pt. Education:  [x] Yes  [] No  [x] Reviewed Prior HEP/Ed- continue previous HEP  Method of Education: [x] Verbal  [x] Demo  [] Written    Access Code: [email or web address]: [email or web address]/Date: 03/03/2023  Prepared by: Dang Barone  Exercises  Sidelying Hip Abduction - 1 x daily - 7 x weekly - 2 sets - 10 reps  Clamshell - 1 x daily - 7 x weekly - 2 sets - 10 reps      Comprehension of Education:  [x] Verbalizes understanding. [x] Demonstrates understanding. [] Needs review. [] Demonstrates/verbalizes HEP/Ed previously given. Plan: [x] Continue current frequency toward long and short term goals.           Time In: 10:00am         Time Out: 10:55am         Electronically signed by:  Dang Barone PTA If you are a smoker, it is important for your health to stop smoking. Please be aware that second hand smoke is also harmful.

## 2023-04-11 ENCOUNTER — APPOINTMENT (OUTPATIENT)
Dept: ENDOCRINOLOGY | Facility: CLINIC | Age: 78
End: 2023-04-11
Payer: MEDICARE

## 2023-04-11 VITALS
WEIGHT: 158 LBS | SYSTOLIC BLOOD PRESSURE: 129 MMHG | HEIGHT: 64 IN | OXYGEN SATURATION: 96 % | HEART RATE: 75 BPM | BODY MASS INDEX: 26.98 KG/M2 | DIASTOLIC BLOOD PRESSURE: 80 MMHG

## 2023-04-11 DIAGNOSIS — E55.9 VITAMIN D DEFICIENCY, UNSPECIFIED: ICD-10-CM

## 2023-04-11 PROCEDURE — 99214 OFFICE O/P EST MOD 30 MIN: CPT | Mod: 25

## 2023-04-11 PROCEDURE — 82962 GLUCOSE BLOOD TEST: CPT

## 2023-04-11 NOTE — HISTORY OF PRESENT ILLNESS
[FreeTextEntry1] : Ms. DALIA GREENE  is a 78 year old female with past medical history of Diabetes Mellitus Type 2, CAD s/p FRANCISCO J (6/2020)  who presents for management of her diabetes. Patient was in [place] for a few weeks. \par \par \par POCT Glucose: 176 mg/dL\par POCT Hga1c: %\par \par \par Diabetes first diagnosed:8/2020\par Type:2\par \par Current diabetic regimen:\par Metformin 500 mg BID\par Other relevant medications:\par \par Self monitoring blood glucose : Patient checks few times a day\par \par SMBG:\par Pre-breakfast: 130s\par Pre-lunch:\par Pre-dinner:\par bedtime:\par \par Hypoglycemia:none\par \par Diet:\par Breakfast: english muffin with jam\par Lunch: half a roll with hot dog, egg and ham sandwich\par Dinner: meat, veggies, baked potato\par Snacks: ice cream \par \par Exercise:\par \par Urine micro:WNL (5/2021), wnl (11/16/20)\par lipid profile:LDL 70 (5/2021), LDL 64 (8/2020)\par last hba1c: 6.7% (1/2022), 6.8% (9/2021), 7.4% (5/2021), 6.8% (2/11/21), 7% (11/2020),8.4% (8/2020)\par eye exam: 2023\par diabetic foot exam/podiatry: in office (9/2020)\par \par \par \par \par

## 2023-04-11 NOTE — ASSESSMENT
[FreeTextEntry1] : 78 year old female with past medical history of Diabetes Mellitus Type 2, CAD s/p FRANCISCO J (6/2020)  who presents for management of her diabetes\par \par 1. DM 2- controlled, uncomplicated\par Patient counseled on the importance of diabetic control and complications. Patient's diet and the necessary changes discussed. Reviewed over glycemic goals. Given recent CAD will aim for A1C <7\par \par Cont Metformin 500 mg BID\par Check fsg  BID\par Cont diabetic diet\par Cont exercise\par labs today\par blood drawn in the office\par Opthalmology Visit up to date\par Foot Exam up to date\par Patient refuses bone density at this time\par \par 2. HLD- stable\par Cont atorvastatin\par \par 3.HTN\par Cont Losartan and Metoprolol. \par \par 4. Vitamin D deficiency\par Cont Vitamin D\par \par Follow up in 6 months\par \par \par

## 2023-04-13 ENCOUNTER — APPOINTMENT (OUTPATIENT)
Dept: ENDOCRINOLOGY | Facility: CLINIC | Age: 78
End: 2023-04-13

## 2023-04-14 ENCOUNTER — NON-APPOINTMENT (OUTPATIENT)
Age: 78
End: 2023-04-14

## 2023-04-14 LAB
25(OH)D3 SERPL-MCNC: 32.6 NG/ML
ALBUMIN SERPL ELPH-MCNC: 4.5 G/DL
ALP BLD-CCNC: 88 U/L
ALT SERPL-CCNC: 70 U/L
ANION GAP SERPL CALC-SCNC: 15 MMOL/L
AST SERPL-CCNC: 54 U/L
BASOPHILS # BLD AUTO: 0.09 K/UL
BASOPHILS NFR BLD AUTO: 1 %
BILIRUB SERPL-MCNC: 0.3 MG/DL
BUN SERPL-MCNC: 19 MG/DL
CALCIUM SERPL-MCNC: 10.3 MG/DL
CHLORIDE SERPL-SCNC: 105 MMOL/L
CHOLEST SERPL-MCNC: 147 MG/DL
CO2 SERPL-SCNC: 23 MMOL/L
CREAT SERPL-MCNC: 0.78 MG/DL
CREAT SPEC-SCNC: 84 MG/DL
EGFR: 77 ML/MIN/1.73M2
EOSINOPHIL # BLD AUTO: 0.34 K/UL
EOSINOPHIL NFR BLD AUTO: 3.7 %
ESTIMATED AVERAGE GLUCOSE: 163 MG/DL
FOLATE SERPL-MCNC: 9.2 NG/ML
FRUCTOSAMINE SERPL-MCNC: 288 UMOL/L
GLUCOSE SERPL-MCNC: 163 MG/DL
HBA1C MFR BLD HPLC: 7.3 %
HCT VFR BLD CALC: 45.1 %
HDLC SERPL-MCNC: 49 MG/DL
HGB BLD-MCNC: 14.4 G/DL
IMM GRANULOCYTES NFR BLD AUTO: 0.2 %
LDLC SERPL CALC-MCNC: 75 MG/DL
LYMPHOCYTES # BLD AUTO: 1.89 K/UL
LYMPHOCYTES NFR BLD AUTO: 20.5 %
MAN DIFF?: NORMAL
MCHC RBC-ENTMCNC: 28.6 PG
MCHC RBC-ENTMCNC: 31.9 GM/DL
MCV RBC AUTO: 89.5 FL
MICROALBUMIN 24H UR DL<=1MG/L-MCNC: 1.7 MG/DL
MICROALBUMIN/CREAT 24H UR-RTO: 21 MG/G
MONOCYTES # BLD AUTO: 0.78 K/UL
MONOCYTES NFR BLD AUTO: 8.5 %
NEUTROPHILS # BLD AUTO: 6.1 K/UL
NEUTROPHILS NFR BLD AUTO: 66.1 %
NONHDLC SERPL-MCNC: 98 MG/DL
PLATELET # BLD AUTO: 321 K/UL
POTASSIUM SERPL-SCNC: 4.5 MMOL/L
PROT SERPL-MCNC: 7.8 G/DL
RBC # BLD: 5.04 M/UL
RBC # FLD: 13.6 %
SODIUM SERPL-SCNC: 143 MMOL/L
TRIGL SERPL-MCNC: 114 MG/DL
TSH SERPL-ACNC: 2.34 UIU/ML
VIT B12 SERPL-MCNC: 565 PG/ML
WBC # FLD AUTO: 9.22 K/UL

## 2023-05-01 ENCOUNTER — NON-APPOINTMENT (OUTPATIENT)
Age: 78
End: 2023-05-01

## 2023-05-01 ENCOUNTER — APPOINTMENT (OUTPATIENT)
Dept: CARDIOLOGY | Facility: CLINIC | Age: 78
End: 2023-05-01
Payer: MEDICARE

## 2023-05-01 VITALS
WEIGHT: 156 LBS | OXYGEN SATURATION: 99 % | HEIGHT: 64 IN | BODY MASS INDEX: 26.63 KG/M2 | DIASTOLIC BLOOD PRESSURE: 81 MMHG | HEART RATE: 99 BPM | SYSTOLIC BLOOD PRESSURE: 130 MMHG

## 2023-05-01 PROCEDURE — 93000 ELECTROCARDIOGRAM COMPLETE: CPT

## 2023-05-01 PROCEDURE — 99214 OFFICE O/P EST MOD 30 MIN: CPT

## 2023-05-01 NOTE — PHYSICAL EXAM
[General Appearance - Well Developed] : well developed [Normal Appearance] : normal appearance [Well Groomed] : well groomed [General Appearance - Well Nourished] : well nourished [No Deformities] : no deformities [General Appearance - In No Acute Distress] : no acute distress [Normal Conjunctiva] : the conjunctiva exhibited no abnormalities [Eyelids - No Xanthelasma] : the eyelids demonstrated no xanthelasmas [Normal Oral Mucosa] : normal oral mucosa [No Oral Pallor] : no oral pallor [No Oral Cyanosis] : no oral cyanosis [Normal Jugular Venous A Waves Present] : normal jugular venous A waves present [Normal Jugular Venous V Waves Present] : normal jugular venous V waves present [No Jugular Venous Maxwell A Waves] : no jugular venous maxwell A waves [Respiration, Rhythm And Depth] : normal respiratory rhythm and effort [Exaggerated Use Of Accessory Muscles For Inspiration] : no accessory muscle use [Auscultation Breath Sounds / Voice Sounds] : lungs were clear to auscultation bilaterally [Abdomen Soft] : soft [Abdomen Tenderness] : non-tender [Abdomen Mass (___ Cm)] : no abdominal mass palpated [Abnormal Walk] : normal gait [Gait - Sufficient For Exercise Testing] : the gait was sufficient for exercise testing [Nail Clubbing] : no clubbing of the fingernails [Cyanosis, Localized] : no localized cyanosis [Petechial Hemorrhages (___cm)] : no petechial hemorrhages [Skin Color & Pigmentation] : normal skin color and pigmentation [] : no rash [No Venous Stasis] : no venous stasis [Skin Lesions] : no skin lesions [No Skin Ulcers] : no skin ulcer [No Xanthoma] : no  xanthoma was observed [Oriented To Time, Place, And Person] : oriented to person, place, and time [Affect] : the affect was normal [Mood] : the mood was normal [No Anxiety] : not feeling anxious [Normal Rate] : normal [Rhythm Regular] : regular [Normal S1] : normal S1 [Normal S2] : normal S2 [No Murmur] : no murmurs heard [2+] : left 2+ [No Pitting Edema] : no pitting edema present [S3] : no S3 [S4] : no S4 [Left Carotid Bruit] : no bruit heard over the left carotid [Right Carotid Bruit] : no bruit heard over the right carotid [Right Femoral Bruit] : no bruit heard over the right femoral artery [Left Femoral Bruit] : no bruit heard over the left femoral artery

## 2023-05-01 NOTE — HISTORY OF PRESENT ILLNESS
[FreeTextEntry1] : Kristan presented to the office today for a cardiovascular evaluation. I saw her 6 months ago.\par \par She is now 78 years old, with a history of anxiety and depression. She has been taking nortriptyline and Klonopin. She has not seen doctors frequently. She presented to the hospital in June, 2020 with chest discomfort and elevated troponin. She was transferred for cardiac catheterization, was found to have an occluded diagonal branch. She had PCI. Her ejection fraction post MI was normal. She was discharged.\par \par I saw her in the office in November, 2021 and her blood pressure was elevated. I added chlorthalidone. Several days later, in the setting of a dental infection, she had an episode of syncope. Chlorthalidone was discontinued.  After that, I increased amlodipine.  At her visit in November, 2021, her blood pressure was elevated here, but controlled at home.  A follow-up echocardiogram was performed, which revealed a normal ejection fraction.\par \par She was seen in November 2022, and was feeling well at that time.  She reported a mild COVID infection several months prior.  She reported that her blood pressure was reasonably controlled most of the time.  She was planning on a cataract procedure at that time, which was performed without complication.\par \par She has been feeling well from a cardiovascular perspective since the last visit.   She remains on aspirin. Her blood pressure has been generally controlled, often in the 130 range.  She has not been lightheaded.  She remains without symptoms of angina or heart failure.  Her most recent cholesterol profile and hemoglobin A1c were reasonable.  She has been having a very hard time in terms of anxiety and depression.  There have been family discussions about selling the house and moving, and she feels that she is deteriorating emotionally.  She is in search of a new psychiatrist, and thinks she has found one at [facility].  She wanted to come and get checked here before she agreed to change any of her medications.

## 2023-05-06 NOTE — ED ADULT NURSE NOTE - SUICIDE SCREENING QUESTION 2
No

## 2023-06-15 ENCOUNTER — RX RENEWAL (OUTPATIENT)
Age: 78
End: 2023-06-15

## 2023-09-20 ENCOUNTER — APPOINTMENT (OUTPATIENT)
Dept: ENDOCRINOLOGY | Facility: CLINIC | Age: 78
End: 2023-09-20
Payer: MEDICARE

## 2023-09-20 VITALS
SYSTOLIC BLOOD PRESSURE: 136 MMHG | HEIGHT: 64 IN | WEIGHT: 158 LBS | DIASTOLIC BLOOD PRESSURE: 82 MMHG | BODY MASS INDEX: 26.98 KG/M2 | OXYGEN SATURATION: 97 % | HEART RATE: 81 BPM

## 2023-09-20 PROCEDURE — 82962 GLUCOSE BLOOD TEST: CPT

## 2023-09-20 PROCEDURE — 99214 OFFICE O/P EST MOD 30 MIN: CPT | Mod: 25

## 2023-09-20 PROCEDURE — 83036 HEMOGLOBIN GLYCOSYLATED A1C: CPT | Mod: QW

## 2023-10-31 ENCOUNTER — APPOINTMENT (OUTPATIENT)
Dept: ENDOCRINOLOGY | Facility: CLINIC | Age: 78
End: 2023-10-31

## 2023-11-08 ENCOUNTER — NON-APPOINTMENT (OUTPATIENT)
Age: 78
End: 2023-11-08

## 2023-11-08 ENCOUNTER — APPOINTMENT (OUTPATIENT)
Dept: CARDIOLOGY | Facility: CLINIC | Age: 78
End: 2023-11-08
Payer: MEDICARE

## 2023-11-08 VITALS
OXYGEN SATURATION: 99 % | SYSTOLIC BLOOD PRESSURE: 124 MMHG | DIASTOLIC BLOOD PRESSURE: 70 MMHG | HEART RATE: 89 BPM | HEIGHT: 64 IN

## 2023-11-08 PROCEDURE — 99214 OFFICE O/P EST MOD 30 MIN: CPT

## 2023-11-08 PROCEDURE — 93000 ELECTROCARDIOGRAM COMPLETE: CPT

## 2023-11-08 RX ORDER — LOSARTAN POTASSIUM 100 MG/1
100 TABLET, FILM COATED ORAL DAILY
Qty: 90 | Refills: 3 | Status: ACTIVE | COMMUNITY
Start: 1900-01-01 | End: 1900-01-01

## 2023-11-08 RX ORDER — ASPIRIN ENTERIC COATED TABLETS 81 MG 81 MG/1
81 TABLET, DELAYED RELEASE ORAL
Qty: 90 | Refills: 3 | Status: ACTIVE | COMMUNITY
Start: 2020-06-24 | End: 1900-01-01

## 2023-11-08 RX ORDER — METOPROLOL SUCCINATE 50 MG/1
50 TABLET, EXTENDED RELEASE ORAL DAILY
Qty: 135 | Refills: 3 | Status: ACTIVE | COMMUNITY
Start: 1900-01-01 | End: 1900-01-01

## 2023-11-08 RX ORDER — ATORVASTATIN CALCIUM 40 MG/1
40 TABLET, FILM COATED ORAL
Qty: 90 | Refills: 3 | Status: ACTIVE | COMMUNITY
Start: 2023-06-15 | End: 1900-01-01

## 2023-11-08 RX ORDER — AMLODIPINE BESYLATE 10 MG/1
10 TABLET ORAL DAILY
Qty: 90 | Refills: 3 | Status: ACTIVE | COMMUNITY
Start: 2020-10-01 | End: 1900-01-01

## 2024-01-22 ENCOUNTER — APPOINTMENT (OUTPATIENT)
Dept: ENDOCRINOLOGY | Facility: CLINIC | Age: 79
End: 2024-01-22
Payer: MEDICARE

## 2024-01-22 VITALS
HEART RATE: 79 BPM | SYSTOLIC BLOOD PRESSURE: 131 MMHG | DIASTOLIC BLOOD PRESSURE: 76 MMHG | BODY MASS INDEX: 28 KG/M2 | WEIGHT: 164 LBS | OXYGEN SATURATION: 98 % | HEIGHT: 64 IN

## 2024-01-22 PROCEDURE — 82962 GLUCOSE BLOOD TEST: CPT

## 2024-01-22 PROCEDURE — 83036 HEMOGLOBIN GLYCOSYLATED A1C: CPT | Mod: QW

## 2024-01-22 PROCEDURE — 99214 OFFICE O/P EST MOD 30 MIN: CPT | Mod: 25

## 2024-01-22 RX ORDER — METFORMIN HYDROCHLORIDE 500 MG/1
500 TABLET, COATED ORAL TWICE DAILY
Qty: 180 | Refills: 2 | Status: ACTIVE | COMMUNITY
Start: 2020-08-25 | End: 1900-01-01

## 2024-01-22 NOTE — ASSESSMENT
[FreeTextEntry1] : 78 year old female with past medical history of Diabetes Mellitus Type 2, CAD s/p FRANCISCO J (6/2020) who presents for management of her diabetes  1. DM 2- controlled, uncomplicated Patient counseled on the importance of diabetic control and complications. Patient's diet and the necessary changes discussed. Reviewed over glycemic goals. Given recent CAD will aim for A1C <7.  Patient would like to work on diet and exercise to bring the sugars down.  If next time the sugars are still high we will likely start a second agent.  Cont Metformin 500 mg BID Check fsg BID Cont diabetic diet Cont exercise Will draw labs with PCP Opthalmology Visit up to date Foot Exam at follow-up Patient refuses bone density at this time  2. HLD- stable Cont atorvastatin  3.HTN Cont Losartan and Metoprolol.  4. Vitamin D deficiency Cont Vitamin D

## 2024-01-22 NOTE — HISTORY OF PRESENT ILLNESS
[FreeTextEntry1] : Ms. DALIA GREENE  is a 78 year old female with past medical history of Diabetes Mellitus Type 2, CAD s/p FRANCISCO J (6/2020)  who presents for management of her diabetes.  Patient admits to poor compliance with her diet.   POCT Glucose: 161 mg/dL POCT Hga1c: 7.9%   Diabetes first diagnosed:8/2020 Type:2  Current diabetic regimen: Metformin 500 mg BID Other relevant medications:  Self monitoring blood glucose : Patient has not been checking  SMBG: Pre-breakfast:  Pre-lunch: Pre-dinner: bedtime:  Hypoglycemia:none  Diet: Breakfast: english muffin with jam Lunch: half a roll with hot dog, egg and ham sandwich Dinner: meat, veggies, baked potato Snacks: ice cream   Exercise:  Urine micro:WNL (5/2021), wnl (11/16/20) lipid profile:LDL 70 (5/2021), LDL 64 (8/2020) last hba1c: 7.9% (1/2024), 6.7% (1/2022), 6.8% (9/2021), 7.4% (5/2021), 6.8% (2/11/21), 7% (11/2020),8.4% (8/2020) eye exam: 2023 diabetic foot exam/podiatry: in office (9/2020)

## 2024-03-25 NOTE — ED ADULT NURSE NOTE - SUICIDE SCREENING QUESTION 3
SLP completed evaluation. Please refer to notes in EMR.      Soraida Nunez MA CCC-SLP #[id number]  Speech Language Pathologist     No

## 2024-05-09 ENCOUNTER — APPOINTMENT (OUTPATIENT)
Dept: CARDIOLOGY | Facility: CLINIC | Age: 79
End: 2024-05-09
Payer: MEDICARE

## 2024-05-09 ENCOUNTER — NON-APPOINTMENT (OUTPATIENT)
Age: 79
End: 2024-05-09

## 2024-05-09 VITALS
WEIGHT: 159 LBS | BODY MASS INDEX: 27.14 KG/M2 | OXYGEN SATURATION: 96 % | HEIGHT: 64 IN | HEART RATE: 87 BPM | DIASTOLIC BLOOD PRESSURE: 79 MMHG | SYSTOLIC BLOOD PRESSURE: 133 MMHG

## 2024-05-09 DIAGNOSIS — I25.10 ATHEROSCLEROTIC HEART DISEASE OF NATIVE CORONARY ARTERY W/OUT ANGINA PECTORIS: ICD-10-CM

## 2024-05-09 PROCEDURE — G2211 COMPLEX E/M VISIT ADD ON: CPT

## 2024-05-09 PROCEDURE — 99214 OFFICE O/P EST MOD 30 MIN: CPT

## 2024-05-09 PROCEDURE — 93000 ELECTROCARDIOGRAM COMPLETE: CPT

## 2024-05-09 NOTE — HISTORY OF PRESENT ILLNESS
[FreeTextEntry1] : Kristan presented to the office today for a cardiovascular evaluation. I saw her 6 months ago.  She is now 79 years old, with a history of anxiety and depression. She has been taking nortriptyline and Klonopin. She has not seen doctors frequently. She presented to the hospital in June, 2020 with chest discomfort and elevated troponin. She was transferred for cardiac catheterization, was found to have an occluded diagonal branch. She had PCI. Her ejection fraction post MI was normal. She was discharged.  I saw her in the office in November, 2021 and her blood pressure was elevated. I added chlorthalidone. Several days later, in the setting of a dental infection, she had an episode of syncope. Chlorthalidone was discontinued.  After that, I increased amlodipine.  At her visit in November, 2021, her blood pressure was elevated here, but controlled at home.  A follow-up echocardiogram was performed, which revealed a normal ejection fraction.  She was seen in November 2022, and was feeling well at that time.  She reported a mild COVID infection several months prior.  She reported that her blood pressure was reasonably controlled most of the time.  She was planning on a cataract procedure at that time, which was performed without complication.  I saw her in the office in November, 2023, and she was feeling well at that time.  She has been feeling well from a cardiovascular perspective since the last visit.   She remains on aspirin. Her blood pressure has been generally controlled, often in the 125 range.  She has not been lightheaded.  She remains without symptoms of angina or heart failure.  She has remained sedentary. Her most recent cholesterol profile and hemoglobin A1c were reasonable.  She had been having a very hard time in terms of anxiety and depression, and has been seeing a therapist, which helps.  This is more recently improved.  Her medications were adjusted.  She has been taking some classes in literature and in history.

## 2024-05-09 NOTE — DISCUSSION/SUMMARY
[FreeTextEntry1] : She is feeling well following her presentation with an acute coronary syndrome.  She is without signs or symptoms of acute ischemia, significant arrhythmia or volume overload.  She is normotensive today, and has been well controlled in terms of her blood pressure at home.  I have encouraged her to continue what ever physical activities she can, though she does not really like it all that much.  I will make no changes at this time.  I reviewed her most recent echocardiogram from December, as well as her most recent catheterization from 2020, and her most recent blood work.  If all is well, she will see me in 6 months.  I will consider another echocardiogram and carotid ultrasound at that time. [EKG obtained to assist in diagnosis and management of assessed problem(s)] : EKG obtained to assist in diagnosis and management of assessed problem(s)

## 2024-06-20 ENCOUNTER — APPOINTMENT (OUTPATIENT)
Dept: ENDOCRINOLOGY | Facility: CLINIC | Age: 79
End: 2024-06-20
Payer: MEDICARE

## 2024-06-20 VITALS
HEART RATE: 67 BPM | OXYGEN SATURATION: 98 % | DIASTOLIC BLOOD PRESSURE: 73 MMHG | WEIGHT: 155 LBS | BODY MASS INDEX: 26.46 KG/M2 | HEIGHT: 64 IN | SYSTOLIC BLOOD PRESSURE: 117 MMHG

## 2024-06-20 DIAGNOSIS — E11.65 TYPE 2 DIABETES MELLITUS WITH HYPERGLYCEMIA: ICD-10-CM

## 2024-06-20 DIAGNOSIS — E78.5 HYPERLIPIDEMIA, UNSPECIFIED: ICD-10-CM

## 2024-06-20 DIAGNOSIS — I10 ESSENTIAL (PRIMARY) HYPERTENSION: ICD-10-CM

## 2024-06-20 PROCEDURE — G2211 COMPLEX E/M VISIT ADD ON: CPT

## 2024-06-20 PROCEDURE — 99214 OFFICE O/P EST MOD 30 MIN: CPT

## 2024-06-20 NOTE — HISTORY OF PRESENT ILLNESS
[FreeTextEntry1] : Ms. DALIA GREENE  is a 79 year old female with past medical history of Diabetes Mellitus Type 2, CAD s/p FRANCISCO J (6/2020)  who presents for management of her diabetes.  Patient admits to poor compliance with her diet.   POCT Glucose: mg/dL POCT Hga1c: %   Diabetes first diagnosed:8/2020 Type:2  Current diabetic regimen: Metformin 500 mg BID Other relevant medications:  Self monitoring blood glucose : Patient has not been checking  SMBG: Pre-breakfast:  Pre-lunch: Pre-dinner: bedtime:  Hypoglycemia:none  Diet: Breakfast: english muffin with jam Lunch: half a roll with hot dog, egg and ham sandwich Dinner: meat, veggies, baked potato Snacks: ice cream   Exercise:  Urine micro:WNL (5/2021), wnl (11/16/20) lipid profile:LDL 70 (5/2021), LDL 64 (8/2020) last hba1c: 7.9% (1/2024), 6.7% (1/2022), 6.8% (9/2021), 7.4% (5/2021), 6.8% (2/11/21), 7% (11/2020),8.4% (8/2020) eye exam: 2023 diabetic foot exam/podiatry: in office (9/2020)

## 2024-06-20 NOTE — ASSESSMENT
[FreeTextEntry1] : 79 year old female with past medical history of Diabetes Mellitus Type 2, CAD s/p FRANCISCO J (6/2020) who presents for management of her diabetes  1. DM 2- controlled, uncomplicated Patient counseled on the importance of diabetic control and complications. Patient's diet and the necessary changes discussed. Reviewed over glycemic goals. Given recent CAD will aim for A1C <7.  Patient would like to work on diet and exercise to bring the sugars down.  If next time the sugars are still high we will likely start a second agent.  Cont Metformin 500 mg BID Check fsg BID Cont diabetic diet Cont exercise Will draw labs today Opthalmology Visit up to date Foot Exam at follow-up Patient refuses bone density at this time  2. HLD- stable Cont atorvastatin  3.HTN Cont Losartan and Metoprolol.  4. Vitamin D deficiency Cont Vitamin D

## 2024-06-21 LAB
ALBUMIN SERPL ELPH-MCNC: 4.3 G/DL
ALP BLD-CCNC: 100 U/L
ALT SERPL-CCNC: 90 U/L
ANION GAP SERPL CALC-SCNC: 15 MMOL/L
AST SERPL-CCNC: 87 U/L
BILIRUB SERPL-MCNC: 0.3 MG/DL
BUN SERPL-MCNC: 22 MG/DL
CALCIUM SERPL-MCNC: 10.1 MG/DL
CHLORIDE SERPL-SCNC: 104 MMOL/L
CHOLEST SERPL-MCNC: 132 MG/DL
CO2 SERPL-SCNC: 19 MMOL/L
CREAT SERPL-MCNC: 0.76 MG/DL
EGFR: 79 ML/MIN/1.73M2
ESTIMATED AVERAGE GLUCOSE: 160 MG/DL
FOLATE SERPL-MCNC: 10.1 NG/ML
FRUCTOSAMINE SERPL-MCNC: 307 UMOL/L
GLUCOSE SERPL-MCNC: 123 MG/DL
HBA1C MFR BLD HPLC: 7.2 %
HCT VFR BLD CALC: 42.2 %
HDLC SERPL-MCNC: 41 MG/DL
HGB BLD-MCNC: 14 G/DL
LDLC SERPL CALC-MCNC: 69 MG/DL
MCHC RBC-ENTMCNC: 28.7 PG
MCHC RBC-ENTMCNC: 33.2 GM/DL
MCV RBC AUTO: 86.7 FL
NONHDLC SERPL-MCNC: 91 MG/DL
PLATELET # BLD AUTO: 256 K/UL
POTASSIUM SERPL-SCNC: 4.3 MMOL/L
PROT SERPL-MCNC: 8.5 G/DL
RBC # BLD: 4.87 M/UL
RBC # FLD: 13.2 %
SODIUM SERPL-SCNC: 139 MMOL/L
TRIGL SERPL-MCNC: 121 MG/DL
TSH SERPL-ACNC: 3.29 UIU/ML
VIT B12 SERPL-MCNC: 591 PG/ML
WBC # FLD AUTO: 9.7 K/UL

## 2024-06-24 LAB
CREAT SPEC-SCNC: 68 MG/DL
MICROALBUMIN 24H UR DL<=1MG/L-MCNC: <1.2 MG/DL
MICROALBUMIN/CREAT 24H UR-RTO: NORMAL MG/G

## 2024-11-07 ENCOUNTER — APPOINTMENT (OUTPATIENT)
Dept: CARDIOLOGY | Facility: CLINIC | Age: 79
End: 2024-11-07
Payer: MEDICARE

## 2024-11-07 ENCOUNTER — NON-APPOINTMENT (OUTPATIENT)
Age: 79
End: 2024-11-07

## 2024-11-07 ENCOUNTER — APPOINTMENT (OUTPATIENT)
Dept: ENDOCRINOLOGY | Facility: CLINIC | Age: 79
End: 2024-11-07
Payer: MEDICARE

## 2024-11-07 VITALS
OXYGEN SATURATION: 96 % | HEART RATE: 72 BPM | SYSTOLIC BLOOD PRESSURE: 154 MMHG | HEIGHT: 64 IN | WEIGHT: 157 LBS | BODY MASS INDEX: 26.8 KG/M2 | DIASTOLIC BLOOD PRESSURE: 81 MMHG

## 2024-11-07 VITALS
BODY MASS INDEX: 26.8 KG/M2 | WEIGHT: 157 LBS | HEART RATE: 74 BPM | HEIGHT: 64 IN | SYSTOLIC BLOOD PRESSURE: 130 MMHG | OXYGEN SATURATION: 98 % | DIASTOLIC BLOOD PRESSURE: 71 MMHG

## 2024-11-07 DIAGNOSIS — I10 ESSENTIAL (PRIMARY) HYPERTENSION: ICD-10-CM

## 2024-11-07 DIAGNOSIS — E11.65 TYPE 2 DIABETES MELLITUS WITH HYPERGLYCEMIA: ICD-10-CM

## 2024-11-07 DIAGNOSIS — E78.5 HYPERLIPIDEMIA, UNSPECIFIED: ICD-10-CM

## 2024-11-07 DIAGNOSIS — I25.10 ATHEROSCLEROTIC HEART DISEASE OF NATIVE CORONARY ARTERY W/OUT ANGINA PECTORIS: ICD-10-CM

## 2024-11-07 LAB — HBA1C MFR BLD HPLC: 7.8

## 2024-11-07 PROCEDURE — G2211 COMPLEX E/M VISIT ADD ON: CPT

## 2024-11-07 PROCEDURE — 83036 HEMOGLOBIN GLYCOSYLATED A1C: CPT | Mod: QW

## 2024-11-07 PROCEDURE — 99214 OFFICE O/P EST MOD 30 MIN: CPT

## 2024-11-07 PROCEDURE — 93000 ELECTROCARDIOGRAM COMPLETE: CPT

## 2024-11-08 LAB
ALBUMIN SERPL ELPH-MCNC: 4.3 G/DL
ALP BLD-CCNC: 109 U/L
ALT SERPL-CCNC: 98 U/L
ANION GAP SERPL CALC-SCNC: 13 MMOL/L
AST SERPL-CCNC: 90 U/L
BILIRUB SERPL-MCNC: 0.4 MG/DL
BUN SERPL-MCNC: 19 MG/DL
CALCIUM SERPL-MCNC: 9.8 MG/DL
CHLORIDE SERPL-SCNC: 104 MMOL/L
CHOLEST SERPL-MCNC: 120 MG/DL
CO2 SERPL-SCNC: 22 MMOL/L
CREAT SERPL-MCNC: 0.68 MG/DL
CREAT SPEC-SCNC: 105 MG/DL
EGFR: 87 ML/MIN/1.73M2
ESTIMATED AVERAGE GLUCOSE: 183 MG/DL
FOLATE SERPL-MCNC: 9.7 NG/ML
FRUCTOSAMINE SERPL-MCNC: 314 UMOL/L
GLUCOSE SERPL-MCNC: 111 MG/DL
HBA1C MFR BLD HPLC: 8 %
HCT VFR BLD CALC: 42.8 %
HDLC SERPL-MCNC: 43 MG/DL
HGB BLD-MCNC: 14 G/DL
LDLC SERPL CALC-MCNC: 62 MG/DL
MCHC RBC-ENTMCNC: 28.7 PG
MCHC RBC-ENTMCNC: 32.7 G/DL
MCV RBC AUTO: 87.9 FL
MICROALBUMIN 24H UR DL<=1MG/L-MCNC: 1.9 MG/DL
MICROALBUMIN/CREAT 24H UR-RTO: 18 MG/G
NONHDLC SERPL-MCNC: 77 MG/DL
PLATELET # BLD AUTO: 254 K/UL
POTASSIUM SERPL-SCNC: 4.2 MMOL/L
PROT SERPL-MCNC: 7.9 G/DL
RBC # BLD: 4.87 M/UL
RBC # FLD: 13.2 %
SODIUM SERPL-SCNC: 139 MMOL/L
TRIGL SERPL-MCNC: 76 MG/DL
TSH SERPL-ACNC: 2.72 UIU/ML
VIT B12 SERPL-MCNC: 628 PG/ML
WBC # FLD AUTO: 8.67 K/UL

## 2025-01-06 NOTE — ED ADULT NURSE NOTE - NS ED NOTE ABUSE SUSPICION NEGLECT YN
Thank you for allowing [facility] to provide your medical care.      Please see below for the follow up instructions pertaining to your medical appointment with Dr[facility].    Follow up plan:  1) Ultrasound of Left Lower Extremity in 3 months  2) Visit with Dr. Gray to discuss.      Our office will send you a letter by mail, closer to your follow up time frame, with a reminder to call to schedule appointment(s).    Please call our office with any concerns or questions [phone].  
No

## 2025-01-21 ENCOUNTER — APPOINTMENT (OUTPATIENT)
Dept: ENDOCRINOLOGY | Facility: CLINIC | Age: 80
End: 2025-01-21
Payer: MEDICARE

## 2025-01-21 VITALS
HEART RATE: 110 BPM | WEIGHT: 150 LBS | OXYGEN SATURATION: 99 % | BODY MASS INDEX: 25.61 KG/M2 | HEIGHT: 64 IN | SYSTOLIC BLOOD PRESSURE: 129 MMHG | DIASTOLIC BLOOD PRESSURE: 73 MMHG

## 2025-01-21 DIAGNOSIS — E78.5 HYPERLIPIDEMIA, UNSPECIFIED: ICD-10-CM

## 2025-01-21 DIAGNOSIS — E11.65 TYPE 2 DIABETES MELLITUS WITH HYPERGLYCEMIA: ICD-10-CM

## 2025-01-21 PROCEDURE — G2211 COMPLEX E/M VISIT ADD ON: CPT

## 2025-01-21 PROCEDURE — 99214 OFFICE O/P EST MOD 30 MIN: CPT

## 2025-01-30 LAB
ALBUMIN SERPL ELPH-MCNC: 4.7 G/DL
ALP BLD-CCNC: 114 U/L
ALT SERPL-CCNC: 50 U/L
ANION GAP SERPL CALC-SCNC: 13 MMOL/L
AST SERPL-CCNC: 45 U/L
BILIRUB SERPL-MCNC: 0.4 MG/DL
BUN SERPL-MCNC: 18 MG/DL
CALCIUM SERPL-MCNC: 10.3 MG/DL
CHLORIDE SERPL-SCNC: 104 MMOL/L
CO2 SERPL-SCNC: 21 MMOL/L
CREAT SERPL-MCNC: 0.66 MG/DL
EGFR: 88 ML/MIN/1.73M2
ESTIMATED AVERAGE GLUCOSE: 197 MG/DL
FRUCTOSAMINE SERPL-MCNC: 378 UMOL/L
GLUCOSE SERPL-MCNC: 247 MG/DL
HBA1C MFR BLD HPLC: 8.5 %
POTASSIUM SERPL-SCNC: 4.3 MMOL/L
PROT SERPL-MCNC: 8.5 G/DL
SODIUM SERPL-SCNC: 137 MMOL/L

## 2025-02-06 RX ORDER — METFORMIN HYDROCHLORIDE 500 MG/1
500 TABLET, EXTENDED RELEASE ORAL
Qty: 270 | Refills: 2 | Status: ACTIVE | COMMUNITY
Start: 2025-02-06 | End: 1900-01-01

## 2025-04-07 NOTE — ED PROVIDER NOTE - PATIENT PORTAL LINK FT
.  - recommend symptom directed approach or at minimum, no escalation of care; patient is at risk for further decompensation with possibility of nearing EOL   - hospice eligible, however GOC do not align with hospice philosophy
You can access the FollowMyHealth Patient Portal offered by [facility] by registering at the following website: [email or web address] By joining [employer]’s FollowMyHealth portal, you will also be able to view your health information using other applications (apps) compatible with our system.

## 2025-04-18 ENCOUNTER — APPOINTMENT (OUTPATIENT)
Dept: ENDOCRINOLOGY | Facility: CLINIC | Age: 80
End: 2025-04-18
Payer: MEDICARE

## 2025-04-18 VITALS
DIASTOLIC BLOOD PRESSURE: 62 MMHG | SYSTOLIC BLOOD PRESSURE: 128 MMHG | BODY MASS INDEX: 25.44 KG/M2 | HEART RATE: 62 BPM | HEIGHT: 64 IN | OXYGEN SATURATION: 97 % | WEIGHT: 149 LBS

## 2025-04-18 DIAGNOSIS — I10 ESSENTIAL (PRIMARY) HYPERTENSION: ICD-10-CM

## 2025-04-18 DIAGNOSIS — E11.65 TYPE 2 DIABETES MELLITUS WITH HYPERGLYCEMIA: ICD-10-CM

## 2025-04-18 DIAGNOSIS — E78.5 HYPERLIPIDEMIA, UNSPECIFIED: ICD-10-CM

## 2025-04-18 PROCEDURE — 83036 HEMOGLOBIN GLYCOSYLATED A1C: CPT | Mod: QW

## 2025-05-08 ENCOUNTER — APPOINTMENT (OUTPATIENT)
Dept: CARDIOLOGY | Facility: CLINIC | Age: 80
End: 2025-05-08
Payer: MEDICARE

## 2025-05-08 ENCOUNTER — NON-APPOINTMENT (OUTPATIENT)
Age: 80
End: 2025-05-08

## 2025-05-08 VITALS
WEIGHT: 152 LBS | SYSTOLIC BLOOD PRESSURE: 118 MMHG | HEART RATE: 70 BPM | BODY MASS INDEX: 25.95 KG/M2 | DIASTOLIC BLOOD PRESSURE: 71 MMHG | OXYGEN SATURATION: 97 % | HEIGHT: 64 IN

## 2025-05-08 DIAGNOSIS — I10 ESSENTIAL (PRIMARY) HYPERTENSION: ICD-10-CM

## 2025-05-08 DIAGNOSIS — I25.10 ATHEROSCLEROTIC HEART DISEASE OF NATIVE CORONARY ARTERY W/OUT ANGINA PECTORIS: ICD-10-CM

## 2025-05-08 PROCEDURE — 99214 OFFICE O/P EST MOD 30 MIN: CPT

## 2025-05-08 PROCEDURE — 93000 ELECTROCARDIOGRAM COMPLETE: CPT

## 2025-09-03 ENCOUNTER — APPOINTMENT (OUTPATIENT)
Dept: ENDOCRINOLOGY | Facility: CLINIC | Age: 80
End: 2025-09-03
Payer: MEDICARE

## 2025-09-03 VITALS
HEART RATE: 87 BPM | HEIGHT: 64 IN | DIASTOLIC BLOOD PRESSURE: 86 MMHG | SYSTOLIC BLOOD PRESSURE: 122 MMHG | OXYGEN SATURATION: 97 % | BODY MASS INDEX: 25.27 KG/M2 | WEIGHT: 148 LBS

## 2025-09-03 DIAGNOSIS — E11.65 TYPE 2 DIABETES MELLITUS WITH HYPERGLYCEMIA: ICD-10-CM

## 2025-09-03 DIAGNOSIS — E78.5 HYPERLIPIDEMIA, UNSPECIFIED: ICD-10-CM

## 2025-09-03 LAB
ALBUMIN SERPL ELPH-MCNC: 4.5 G/DL
ALBUMIN, RANDOM URINE: 3.1 MG/DL
ALP BLD-CCNC: 93 U/L
ALT SERPL-CCNC: 67 U/L
ANION GAP SERPL CALC-SCNC: 16 MMOL/L
AST SERPL-CCNC: 60 U/L
BILIRUB SERPL-MCNC: 0.3 MG/DL
BUN SERPL-MCNC: 19 MG/DL
CALCIUM SERPL-MCNC: 10.2 MG/DL
CHLORIDE SERPL-SCNC: 104 MMOL/L
CHOLEST SERPL-MCNC: 126 MG/DL
CO2 SERPL-SCNC: 20 MMOL/L
CREAT SERPL-MCNC: 0.65 MG/DL
CREAT SPEC-SCNC: 130 MG/DL
EGFRCR SERPLBLD CKD-EPI 2021: 88 ML/MIN/1.73M2
FOLATE SERPL-MCNC: 11.8 NG/ML
GLUCOSE SERPL-MCNC: 130 MG/DL
HCT VFR BLD CALC: 42.7 %
HDLC SERPL-MCNC: 41 MG/DL
HGB BLD-MCNC: 13.8 G/DL
LDLC SERPL-MCNC: 66 MG/DL
MCHC RBC-ENTMCNC: 28.6 PG
MCHC RBC-ENTMCNC: 32.3 G/DL
MCV RBC AUTO: 88.4 FL
MICROALBUMIN/CREAT 24H UR-RTO: 24 MG/G
NONHDLC SERPL-MCNC: 85 MG/DL
PLATELET # BLD AUTO: 308 K/UL
POTASSIUM SERPL-SCNC: 4.3 MMOL/L
PROT SERPL-MCNC: 8.2 G/DL
RBC # BLD: 4.83 M/UL
RBC # FLD: 13.2 %
SODIUM SERPL-SCNC: 140 MMOL/L
TRIGL SERPL-MCNC: 106 MG/DL
TSH SERPL-ACNC: 1.95 UIU/ML
VIT B12 SERPL-MCNC: 533 PG/ML
WBC # FLD AUTO: 11.11 K/UL

## 2025-09-03 PROCEDURE — G2211 COMPLEX E/M VISIT ADD ON: CPT

## 2025-09-03 PROCEDURE — 83036 HEMOGLOBIN GLYCOSYLATED A1C: CPT | Mod: QW

## 2025-09-03 PROCEDURE — 99214 OFFICE O/P EST MOD 30 MIN: CPT
